# Patient Record
Sex: FEMALE | Race: WHITE | NOT HISPANIC OR LATINO | Employment: OTHER | URBAN - METROPOLITAN AREA
[De-identification: names, ages, dates, MRNs, and addresses within clinical notes are randomized per-mention and may not be internally consistent; named-entity substitution may affect disease eponyms.]

---

## 2017-08-04 ENCOUNTER — OFFICE VISIT (OUTPATIENT)
Dept: OBSTETRICS AND GYNECOLOGY | Facility: CLINIC | Age: 67
End: 2017-08-04
Payer: MEDICARE

## 2017-08-04 ENCOUNTER — LAB VISIT (OUTPATIENT)
Dept: LAB | Facility: HOSPITAL | Age: 67
End: 2017-08-04
Attending: OBSTETRICS & GYNECOLOGY
Payer: MEDICARE

## 2017-08-04 VITALS
WEIGHT: 113.13 LBS | DIASTOLIC BLOOD PRESSURE: 80 MMHG | BODY MASS INDEX: 20.82 KG/M2 | SYSTOLIC BLOOD PRESSURE: 218 MMHG | HEIGHT: 62 IN

## 2017-08-04 DIAGNOSIS — R19.09 OTHER INTRA-ABDOMINAL AND PELVIC SWELLING, MASS AND LUMP: ICD-10-CM

## 2017-08-04 DIAGNOSIS — N83.8 OVARIAN MASS, LEFT: ICD-10-CM

## 2017-08-04 LAB — CANCER AG125 SERPL-ACNC: 21 U/ML

## 2017-08-04 PROCEDURE — 36415 COLL VENOUS BLD VENIPUNCTURE: CPT

## 2017-08-04 PROCEDURE — 3008F BODY MASS INDEX DOCD: CPT | Mod: ,,, | Performed by: OBSTETRICS & GYNECOLOGY

## 2017-08-04 PROCEDURE — 88175 CYTOPATH C/V AUTO FLUID REDO: CPT

## 2017-08-04 PROCEDURE — 99203 OFFICE O/P NEW LOW 30 MIN: CPT | Mod: S$PBB,,, | Performed by: OBSTETRICS & GYNECOLOGY

## 2017-08-04 PROCEDURE — 86304 IMMUNOASSAY TUMOR CA 125: CPT

## 2017-08-04 PROCEDURE — 99999 PR PBB SHADOW E&M-EST. PATIENT-LVL II: CPT | Mod: PBBFAC,,, | Performed by: OBSTETRICS & GYNECOLOGY

## 2017-08-04 RX ORDER — NICOTINE 7MG/24HR
PATCH, TRANSDERMAL 24 HOURS TRANSDERMAL
Refills: 0 | COMMUNITY
Start: 2017-06-21 | End: 2018-08-20

## 2017-08-04 RX ORDER — CIPROFLOXACIN 500 MG/1
TABLET ORAL
Refills: 0 | COMMUNITY
Start: 2017-07-19 | End: 2017-08-04

## 2017-08-04 RX ORDER — PRAVASTATIN SODIUM 40 MG/1
40 TABLET ORAL
COMMUNITY
End: 2017-08-04 | Stop reason: CLARIF

## 2017-08-04 RX ORDER — HYDROCODONE BITARTRATE AND ACETAMINOPHEN 10; 325 MG/1; MG/1
TABLET ORAL
Refills: 0 | COMMUNITY
Start: 2017-07-19 | End: 2017-09-15

## 2017-08-04 RX ORDER — CLONIDINE HYDROCHLORIDE 0.1 MG/1
0.1 TABLET ORAL
COMMUNITY
Start: 2017-06-09 | End: 2017-09-15

## 2017-08-04 RX ORDER — ROSUVASTATIN CALCIUM 20 MG/1
20 TABLET, COATED ORAL DAILY
COMMUNITY
Start: 2017-06-09 | End: 2018-07-10 | Stop reason: SDUPTHER

## 2017-08-04 RX ORDER — DICYCLOMINE HYDROCHLORIDE 20 MG/1
TABLET ORAL
Refills: 0 | COMMUNITY
Start: 2017-05-24 | End: 2017-08-04

## 2017-08-04 RX ORDER — ASPIRIN 81 MG/1
81 TABLET ORAL DAILY
COMMUNITY
End: 2021-06-20 | Stop reason: CLARIF

## 2017-08-04 RX ORDER — METOPROLOL SUCCINATE 25 MG/1
25 TABLET, EXTENDED RELEASE ORAL DAILY
COMMUNITY
Start: 2017-06-09 | End: 2017-09-15 | Stop reason: ALTCHOICE

## 2017-08-04 RX ORDER — BUSPIRONE HYDROCHLORIDE 10 MG/1
10 TABLET ORAL 2 TIMES DAILY
COMMUNITY
End: 2018-12-18 | Stop reason: SDUPTHER

## 2017-08-04 RX ORDER — LISINOPRIL 40 MG/1
40 TABLET ORAL DAILY
COMMUNITY
End: 2017-09-18 | Stop reason: SDUPTHER

## 2017-08-04 RX ORDER — HYDRALAZINE HYDROCHLORIDE 50 MG/1
50 TABLET, FILM COATED ORAL 2 TIMES DAILY PRN
COMMUNITY
End: 2017-09-15

## 2017-08-04 NOTE — PROGRESS NOTES
Rebeca Woodard is a 67 y.o.  who presents for   Chief Complaint   Patient presents with    Ovarian Cyst     c/o ovarian cyst, was seen at [facility] and had CT and U/S.   - no post menopausal bleeding  - had one day of rectal bleeding after very difficult BM (Constipation) the day she went to the ER (reason she went)  - pt is poor historian but does not remember having a hysterctomy    No pain at all and no c/o    Did not take BP meds yet this AM and only po intake is coffee    Pt is not currently sexually active.    Very poor historian      17 CT at [facility]  Result Impression   1. 8 cm pelvic cyst above the bladder, just to the left of midline. An ovarian cyst is suspected. Pelvic anatomy may be further characterize with ultrasound as clinically indicated.  2. Atrophied left kidney with left ureteral stent in place.  3. Nonspecific presacral opacity. Does this patient have a history of treated rectal or cervical cancer?   Result Narrative   CT ABDOMEN PELVIS WO IV CONTRAST  CLINICAL INDICATION: Left lower quadrant abdominal pain  FINDINGS: Sections through the lower chest demonstrate no significant abnormality.    A CT scan of the abdomen demonstrates a liver and spleen which are within normal limits. The pancreas is within normal limits. There is adenomatous change of both adrenals. The left kidney is atrophied. There is a left ureteral stent in place. Calcifications associated with both kidneys are likely vascular.    A CT scan of the pelvis demonstrates the ureteral stent which terminates in the bladder. No stones are noted in the ureters. There is an 8 cm cyst in the pelvis just to the left of midline at There is some ill-defined presacral opacity. Right-sided thoracic       17 U/S OLOL  9.7 cm right ovarian cyst.   Result Narrative   US TRANSVAGINAL NON OB    CLINICAL INDICATION: Left-sided pelvic pain, ovarian cyst.    FINDINGS: Transvaginal sonography of the pelvis was performed. Uterus is not  "visualized on this exam. There is a 9.7 cm right ovarian cyst which contains some thin septations. The ovaries are otherwise not well visualized. There is no pelvic free fluid.         No hx of abnormal paps. Last pap was normal 2016, per pt.  Last mammogram was normal in 2016, per pt.  Last colonoscopy was 2015, per pt.    Past Medical History:   Diagnosis Date    Anxiety     Colon cancer     Disorder of kidney and ureter     Hepatitis C     Hyperlipidemia     Hypertension        Past Surgical History:   Procedure Laterality Date    COLON SURGERY      DILATION AND CURETTAGE OF UTERUS      missed ab    FEMORAL ARTERY STENT      bilateral    LUNG LOBECTOMY      left    OVARIAN CYST DRAINAGE      RENAL ARTERY STENT         Current Outpatient Prescriptions   Medication Sig Dispense Refill    aspirin (ECOTRIN) 81 MG EC tablet Take 81 mg by mouth.      busPIRone (BUSPAR) 10 MG tablet Take 10 mg by mouth.      cloNIDine (CATAPRES) 0.1 MG tablet Take 0.1 mg by mouth.      hydrALAZINE (APRESOLINE) 50 MG tablet Take 50 mg by mouth.      hydrocodone-acetaminophen 10-325mg (NORCO)  mg Tab TK 1 T PO  Q 8 H PRN P  0    lisinopril (PRINIVIL,ZESTRIL) 40 MG tablet Take 80 mg by mouth.      metoprolol succinate (TOPROL-XL) 25 MG 24 hr tablet Take 25 mg by mouth.      nicotine (NICODERM CQ) 7 mg/24 hr APPLY 1 PATCH QAM  0    rosuvastatin (CRESTOR) 20 MG tablet Take 20 mg by mouth.       No current facility-administered medications for this visit.           Review of patient's allergies indicates:  No Known Allergies    .  Family History   Problem Relation Age of Onset    Breast cancer Neg Hx     Colon cancer Neg Hx     Ovarian cancer Neg Hx     Thrombosis Neg Hx        Social History   Substance Use Topics    Smoking status: Former Smoker     Packs/day: 1.00     Years: 50.00     Types: Cigarettes     Quit date: 9/4/2016    Smokeless tobacco: Never Used    Alcohol use Yes      Comment: "2-3 beers " "daily or every other day"       BP (!) 218/80   Ht 5' 2" (1.575 m)   Wt 51.3 kg (113 lb 1.5 oz)   BMI 20.69 kg/m²     ROS:  GENERAL: No acute complaints. No significant change in weight. Feels great and planning a trip to her condo in [place] this wk  GI: No nausea, vomiting, melena, hematochezia.  : No dysuria, hematuria. No significant urinary incontinence.  GYN: No unusual discharge, pain, bleeding     GEN:  Alert and oriented lady in no acute distress. Ambulates easily  HEAD and NECK: Normocephalic. Normal thyroid to inspection and palpation  SKIN: No hirsutism   BREASTS: deferred               ABDOMEN: Flat, non tender. Mult midline abdominal scars. No mass, hernia, hepatomegaly, RUQT or CVAT.   PELVIC:      Vulva: normal genitalia. No suspicious lesions. No inguinal adenopathy.      Urethra: normal size and location. No lesion, prolapse, or discharge. Non tender.      Vagina: Very atrophic, no unusual discharge, no cystocele or rectocele      Cuff: difficult exam 2ary to atrophy but ? Flat appearing dimple suggestive of atrophic cx noted and pap done. Very scant bloody d/c.        No palpable uterus or cx           Bladder base is non tender.      Adnexa: No masses, tenderness, or CDS nodularity.         IMPRESSION: uncontrolled HTN, large cystic pelvic mass w fine septations - suspect asymptomatic benign process     COUNSELING:  - long talk re her current BP and need for urgent care eval of BP if it does not go down soon (took them "15 min ago" after advised to do so by my nurse)  - discussed significance of the imaging results and suspicion that this is a benign ovarian tumor, but pos malignancy     - in either event she will probably need surgery for dx and tx of symptoms    PLAN:  and pending result make rec's          Repeat BP: 150/60  "

## 2017-08-07 ENCOUNTER — TELEPHONE (OUTPATIENT)
Dept: OBSTETRICS AND GYNECOLOGY | Facility: CLINIC | Age: 67
End: 2017-08-07

## 2017-08-10 ENCOUNTER — TELEPHONE (OUTPATIENT)
Dept: OBSTETRICS AND GYNECOLOGY | Facility: CLINIC | Age: 67
End: 2017-08-10

## 2017-08-10 NOTE — TELEPHONE ENCOUNTER
----- Message from Tanvir Hnaey MD sent at 8/10/2017 12:03 PM CDT -----  Please call pt and let her know her pap was normal

## 2017-09-15 ENCOUNTER — OFFICE VISIT (OUTPATIENT)
Dept: INTERNAL MEDICINE | Facility: CLINIC | Age: 67
End: 2017-09-15
Payer: MEDICARE

## 2017-09-15 ENCOUNTER — OFFICE VISIT (OUTPATIENT)
Dept: OBSTETRICS AND GYNECOLOGY | Facility: CLINIC | Age: 67
End: 2017-09-15
Payer: MEDICARE

## 2017-09-15 ENCOUNTER — LAB VISIT (OUTPATIENT)
Dept: LAB | Facility: HOSPITAL | Age: 67
End: 2017-09-15
Attending: FAMILY MEDICINE
Payer: MEDICARE

## 2017-09-15 VITALS
BODY MASS INDEX: 20.29 KG/M2 | HEART RATE: 70 BPM | HEIGHT: 62 IN | WEIGHT: 110.25 LBS | DIASTOLIC BLOOD PRESSURE: 82 MMHG | TEMPERATURE: 97 F | OXYGEN SATURATION: 99 % | SYSTOLIC BLOOD PRESSURE: 190 MMHG

## 2017-09-15 VITALS
HEIGHT: 62 IN | SYSTOLIC BLOOD PRESSURE: 182 MMHG | WEIGHT: 112.88 LBS | BODY MASS INDEX: 20.77 KG/M2 | DIASTOLIC BLOOD PRESSURE: 68 MMHG

## 2017-09-15 DIAGNOSIS — I10 ESSENTIAL HYPERTENSION: Primary | ICD-10-CM

## 2017-09-15 DIAGNOSIS — I10 ESSENTIAL HYPERTENSION: ICD-10-CM

## 2017-09-15 DIAGNOSIS — N83.201 RIGHT OVARIAN CYST: ICD-10-CM

## 2017-09-15 LAB
ALBUMIN SERPL BCP-MCNC: 3.5 G/DL
ALP SERPL-CCNC: 95 U/L
ALT SERPL W/O P-5'-P-CCNC: 11 U/L
ANION GAP SERPL CALC-SCNC: 8 MMOL/L
AST SERPL-CCNC: 17 U/L
BASOPHILS # BLD AUTO: 0.04 K/UL
BASOPHILS NFR BLD: 0.6 %
BILIRUB SERPL-MCNC: 0.3 MG/DL
BUN SERPL-MCNC: 15 MG/DL
CALCIUM SERPL-MCNC: 9.7 MG/DL
CHLORIDE SERPL-SCNC: 111 MMOL/L
CHOLEST SERPL-MCNC: 236 MG/DL
CHOLEST/HDLC SERPL: 3.7 {RATIO}
CO2 SERPL-SCNC: 22 MMOL/L
CREAT SERPL-MCNC: 1 MG/DL
DIFFERENTIAL METHOD: ABNORMAL
EOSINOPHIL # BLD AUTO: 0.2 K/UL
EOSINOPHIL NFR BLD: 2.3 %
ERYTHROCYTE [DISTWIDTH] IN BLOOD BY AUTOMATED COUNT: 14.4 %
EST. GFR  (AFRICAN AMERICAN): >60 ML/MIN/1.73 M^2
EST. GFR  (NON AFRICAN AMERICAN): 59.3 ML/MIN/1.73 M^2
GLUCOSE SERPL-MCNC: 87 MG/DL
HCT VFR BLD AUTO: 35.9 %
HDLC SERPL-MCNC: 64 MG/DL
HDLC SERPL: 27.1 %
HGB BLD-MCNC: 11.6 G/DL
LDLC SERPL CALC-MCNC: 151.4 MG/DL
LYMPHOCYTES # BLD AUTO: 1.6 K/UL
LYMPHOCYTES NFR BLD: 22.8 %
MCH RBC QN AUTO: 26.9 PG
MCHC RBC AUTO-ENTMCNC: 32.3 G/DL
MCV RBC AUTO: 83 FL
MONOCYTES # BLD AUTO: 0.4 K/UL
MONOCYTES NFR BLD: 6.1 %
NEUTROPHILS # BLD AUTO: 4.8 K/UL
NEUTROPHILS NFR BLD: 67.8 %
NONHDLC SERPL-MCNC: 172 MG/DL
PLATELET # BLD AUTO: 284 K/UL
PMV BLD AUTO: 10.6 FL
POTASSIUM SERPL-SCNC: 4.5 MMOL/L
PROT SERPL-MCNC: 7.7 G/DL
RBC # BLD AUTO: 4.31 M/UL
SODIUM SERPL-SCNC: 141 MMOL/L
TRIGL SERPL-MCNC: 103 MG/DL
TSH SERPL DL<=0.005 MIU/L-ACNC: 0.67 UIU/ML
WBC # BLD AUTO: 7.1 K/UL

## 2017-09-15 PROCEDURE — 85025 COMPLETE CBC W/AUTO DIFF WBC: CPT

## 2017-09-15 PROCEDURE — 93005 ELECTROCARDIOGRAM TRACING: CPT | Mod: PBBFAC | Performed by: INTERNAL MEDICINE

## 2017-09-15 PROCEDURE — 93010 ELECTROCARDIOGRAM REPORT: CPT | Mod: S$PBB,,, | Performed by: INTERNAL MEDICINE

## 2017-09-15 PROCEDURE — 84443 ASSAY THYROID STIM HORMONE: CPT

## 2017-09-15 PROCEDURE — 99213 OFFICE O/P EST LOW 20 MIN: CPT | Mod: S$PBB,,, | Performed by: OBSTETRICS & GYNECOLOGY

## 2017-09-15 PROCEDURE — 99213 OFFICE O/P EST LOW 20 MIN: CPT | Mod: S$PBB,,, | Performed by: FAMILY MEDICINE

## 2017-09-15 PROCEDURE — 99999 PR PBB SHADOW E&M-EST. PATIENT-LVL III: CPT | Mod: PBBFAC,,, | Performed by: OBSTETRICS & GYNECOLOGY

## 2017-09-15 PROCEDURE — 36415 COLL VENOUS BLD VENIPUNCTURE: CPT

## 2017-09-15 PROCEDURE — 80061 LIPID PANEL: CPT

## 2017-09-15 PROCEDURE — 99213 OFFICE O/P EST LOW 20 MIN: CPT | Mod: PBBFAC,25 | Performed by: OBSTETRICS & GYNECOLOGY

## 2017-09-15 PROCEDURE — 99999 PR PBB SHADOW E&M-EST. PATIENT-LVL III: CPT | Mod: PBBFAC,,, | Performed by: FAMILY MEDICINE

## 2017-09-15 PROCEDURE — 99213 OFFICE O/P EST LOW 20 MIN: CPT | Mod: PBBFAC,27 | Performed by: FAMILY MEDICINE

## 2017-09-15 PROCEDURE — 80053 COMPREHEN METABOLIC PANEL: CPT

## 2017-09-15 RX ORDER — TRAMADOL HYDROCHLORIDE 50 MG/1
50 TABLET ORAL
COMMUNITY
End: 2017-09-15

## 2017-09-15 RX ORDER — AMLODIPINE BESYLATE 10 MG/1
10 TABLET ORAL DAILY
Qty: 30 TABLET | Refills: 11 | Status: SHIPPED | OUTPATIENT
Start: 2017-09-15 | End: 2019-01-10 | Stop reason: SDUPTHER

## 2017-09-15 NOTE — PROGRESS NOTES
68 yo CF w/ PMH of essential HTN    Presents to clinic for BP review directly from her OB/GYN.  Was with OB/GYN this morning when BP was noted to be significantly elevated. Sent for further evaluation.    Reports coffee this morning. No other drinks.    Patient Takes Lisinopril 40 daily ( took this morning), Metoprolol succinate 25mg (Did not take last night),  Patient has hydralazine 50mg tablets prescribed - but only takes it when she feels her blood pressure is too high.      Checks BP regular - states normal runs 140s systolic. She believes she was nervous today because of Doctor's appointment - induces anxiety.     Patient reports feeling entirely normal.  No chest pain  No shortness of breath  No n/v  No vision change  No headache.  No urination changes reported.

## 2017-09-15 NOTE — PROGRESS NOTES
"Rebeca Woodard is a 67 y.o.  who presents for follow-up of a roughly 9 cm ovarian cyst - states she is leaving to go to [place] for 2 months on Thurs for personal reasons, wants to wait until she returns for treatment, if possible.  - Ca 125 = 21    No hx of abnormal paps. Last pap was normal on 17.  Last mammogram was normal in , per pt.  Last colonoscopy was , per pt.    Past Medical History:   Diagnosis Date    Anxiety     Colon cancer     Disorder of kidney and ureter     Hepatitis C     Hyperlipidemia     Hypertension        Past Surgical History:   Procedure Laterality Date    COLON SURGERY      DILATION AND CURETTAGE OF UTERUS      missed ab    FEMORAL ARTERY STENT      bilateral    LUNG LOBECTOMY      left    OVARIAN CYST DRAINAGE      RENAL ARTERY STENT         Current Outpatient Prescriptions   Medication Sig Dispense Refill    aspirin (ECOTRIN) 81 MG EC tablet Take 81 mg by mouth.      busPIRone (BUSPAR) 10 MG tablet Take 10 mg by mouth.      hydrALAZINE (APRESOLINE) 50 MG tablet Take 50 mg by mouth.      lisinopril (PRINIVIL,ZESTRIL) 40 MG tablet Take 80 mg by mouth.      metoprolol succinate (TOPROL-XL) 25 MG 24 hr tablet Take 25 mg by mouth.      nicotine (NICODERM CQ) 7 mg/24 hr APPLY 1 PATCH QAM  0    rosuvastatin (CRESTOR) 20 MG tablet Take 20 mg by mouth.       No current facility-administered medications for this visit.           Review of patient's allergies indicates:  No Known Allergies    .  Family History   Problem Relation Age of Onset    Breast cancer Neg Hx     Colon cancer Neg Hx     Ovarian cancer Neg Hx     Thrombosis Neg Hx        Social History   Substance Use Topics    Smoking status: Former Smoker     Packs/day: 1.00     Years: 50.00     Types: Cigarettes     Quit date: 2016    Smokeless tobacco: Never Used    Alcohol use Yes      Comment: "2-3 beers daily or every other day"       BP (!) 182/68   Ht 5' 2" (1.575 m)   Wt 51.2 kg " (112 lb 14 oz)   BMI 20.65 kg/m²     - repeat 174/70 (states she just took her meds and feels fine)    ROS:  GENERAL: No acute complaints.       GEN:  Alert and oriented lady in no acute distress.      IMPRESSION: large asymptomatic probably benign ovarian cyst      COUNSELING:  - Reassured this is probably not ovarian cancer, but discussed possibility of torsion and rec she consider the surgery now as a torsion would require emergency surgery which is never good but especially bad if out of town  - Again had prolonged discussion re importance of BP control to reduce risks of stroke, CHF, etc and need to take meds as Rx'ed and rec she call her PCP today for appt asap or go to urgent care or try to get her appt here.    PLAN: pt needs to go help family and will postpone surgery til she returns and if symptomatic will go to ER or GYN in [place]. Would like to establish primary care here if possible.

## 2017-09-15 NOTE — PATIENT INSTRUCTIONS
Please continue to take your Lisinopril 40mg daily and start the new medication amlodipine 10 mg daily.     We will hold off using the metoprolol and hydralazine for now.    I would like to see you early next week for further evaluation of your blood pressure, discussion of lab findings, and further health maintenance.    If you ever experience chest pain, shortness of breath, change in vision or just not your self in a negative way with your blood pressure high present to ER or contact a doctor.      Controlling High Blood Pressure  High blood pressure (hypertension) is often called the silent killer. This is because many people who have it dont know it. High blood pressure is defined as 140/90 mm Hg or higher. Know your blood pressure and remember to check it regularly. Doing so can save your life. Here are some things you can do to help control your blood pressure.    Choose heart-healthy foods  · Select low-salt, low-fat foods. Limit sodium intake to 2,400 mg per day or the amount suggested by your healthcare provider.  · Limit canned, dried, cured, packaged, and fast foods. These can contain a lot of salt.  · Eat 8 to 10 servings of fruits and vegetables every day.  · Choose lean meats, fish, or chicken.  · Eat whole-grain pasta, brown rice, and beans.  · Eat 2 to 3 servings of low-fat or fat-free dairy products.  · Ask your doctor about the DASH eating plan. This plan helps reduce blood pressure.  · When you go to a restaurant, ask that your meal be prepared with no added salt.  Maintain a healthy weight  · Ask your healthcare provider how many calories to eat a day. Then stick to that number.  · Ask your healthcare provider what weight range is healthiest for you. If you are overweight, a weight loss of only 3% to 5% of your body weight can help lower blood pressure. Generally, a good weight loss goal is to lose 10% of your body weight in a year.  · Limit snacks and sweets.  · Get regular exercise.  Get up  and get active  · Choose activities you enjoy. Find ones you can do with friends or family. This includes bicycling, dancing, walking, and jogging.  · Park farther away from building entrances.  · Use stairs instead of the elevator.  · When you can, walk or bike instead of driving.  · [place] leaves, garden, or do household repairs.  · Be active at a moderate to vigorous level of physical activity for at least 40 minutes for a minimum of 3 to 4 days a week.   Manage stress  · Make time to relax and enjoy life. Find time to laugh.  · Communicate your concerns with your loved ones and your healthcare provider.  · Visit with family and friends, and keep up with hobbies.  Limit alcohol and quit smoking  · Men should have no more than 2 drinks per day.  · Women should have no more than 1 drink per day.  · Talk with your healthcare provider about quitting smoking. Smoking significantly increases your risk for heart disease and stroke. Ask your healthcare provider about community smoking cessation programs and other options.  Medicines  If lifestyle changes arent enough, your healthcare provider may prescribe high blood pressure medicine. Take all medicines as prescribed. If you have any questions about your medicines, ask your healthcare provider before stopping or changing them.   Date Last Reviewed: 4/27/2016  © 4239-2651 [employer]. [address] [place] [address], [place] [address]. All rights reserved. This information is not intended as a substitute for professional medical care. Always follow your healthcare professional's instructions.

## 2017-09-15 NOTE — PROGRESS NOTES
Subjective:       Patient ID: Rebeca Woodard is a 67 y.o. female.    Chief Complaint: Establish Care (Check Blood Pressure)    68 yo CF w/ PMH of essential HTN    Presents to clinic for BP review directly from her OB/GYN.  Was with OB/GYN this morning when BP was noted to be significantly elevated. Sent for further evaluation.    Reports coffee this morning. No other drinks.    Patient Takes Lisinopril 40 daily ( took this morning), Metoprolol succinate 25mg (Did not take last night),  Patient has hydralazine 50mg tablets prescribed - but only takes it when she feels her blood pressure is too high. She states she rarely takes it as it drops her blood pressure too low.  Patient does not watch low salt diet closely.      Checks BP regular - states normal runs 140s systolic. She believes she was nervous today because of Doctor's appointment - induces anxiety.   However she also noted that she has had to be hospitalized for sporadically and severely elevated blood pressure values.    Patient reports feeling entirely normal.  No chest pain  No shortness of breath  No N/V  No vision change  No headache.  No urination changes reported.    Former smoker.  No history of heart disease.    --------------------            09/15/17               1134     --------------------   BP:     (!) 190/82   Pulse:               Temp:               --------------------        Review of Systems   Constitutional: Negative for activity change, appetite change, fever and unexpected weight change.   HENT: Negative for congestion, sinus pressure and voice change.    Eyes: Negative for pain and visual disturbance.   Respiratory: Negative for cough, choking, chest tightness and shortness of breath.    Cardiovascular: Negative for chest pain and leg swelling.   Gastrointestinal: Negative for blood in stool, constipation, diarrhea, nausea and vomiting.   Endocrine: Negative for cold intolerance, heat intolerance and polyuria.    Genitourinary: Negative for difficulty urinating and dysuria.   Musculoskeletal: Negative for arthralgias, gait problem and myalgias.   Skin: Negative for color change, pallor and wound.   Neurological: Negative for dizziness, facial asymmetry, speech difficulty, weakness, light-headedness, numbness and headaches.   Hematological: Does not bruise/bleed easily.   Psychiatric/Behavioral: Negative for confusion. The patient is nervous/anxious.         Reports white coat anxiety.       Objective:      Physical Exam   Constitutional: She is oriented to person, place, and time. She appears well-developed and well-nourished. She does not have a sickly appearance. No distress.   HENT:   Head: Normocephalic and atraumatic.   Right Ear: External ear normal.   Left Ear: External ear normal.   Eyes: EOM and lids are normal. Right conjunctiva is not injected. Right conjunctiva has no hemorrhage. Left conjunctiva is not injected. Left conjunctiva has no hemorrhage.   Neck: Trachea normal, normal range of motion and full passive range of motion without pain.   Cardiovascular: Normal rate, regular rhythm, normal heart sounds and intact distal pulses.    Pulmonary/Chest: Effort normal and breath sounds normal. No accessory muscle usage or stridor. No tachypnea. No respiratory distress. She has no decreased breath sounds. She has no wheezes. She has no rhonchi. She has no rales.   Abdominal: Soft. Normal appearance and bowel sounds are normal. She exhibits no distension. There is no tenderness. There is no guarding. No hernia.   Musculoskeletal: Normal range of motion.   Lymphadenopathy:     She has no cervical adenopathy.   Neurological: She is alert and oriented to person, place, and time. She is not disoriented.   Skin: Skin is warm, dry and intact. No rash noted. She is not diaphoretic.   Psychiatric: She has a normal mood and affect. Her speech is normal and behavior is normal. Thought content normal.       Assessment:        1. Essential hypertension        Plan:   Essential hypertension    Patient presented to clinic with quite significantly elevated blood pressure - however was entirely asymptomatic. Patient took a 1/2 tablet of her 50 mg hydralazine. Tablet was halved as she reports she is very sensitive to it and it has dropped her too low in the past. Patient then rested in dark room for 45 minutes. On recheck it was dropped to 190 systolic and patient stayed asymptomatic.  EKG to be obtained.   CBC, CMP, TSH to be obtained for evaluation.  Lipid panel to be obtained given risk and former smoker.  Patient to see cardiology given her highly variable blood pressure readings - as she states she typically runs 130s-140s systolic, but has had these episodes of severely elevated blood pressure values requiring hospital admissions and ER visits. Likely will require further investigations (ie echo).    Addition of 10mg amlodipine for improved BP controlled. Continue Lisinopril. As she was not taking metoprolol regularly and hydralazine (almost ever) - hold those meds for now.  Advised to monitor and keep track of BP.    Patient will return on Monday to see cardiology for review, followed by visit with myself for lab review - health maintenance.      No Follow-up on file.

## 2017-09-18 ENCOUNTER — OFFICE VISIT (OUTPATIENT)
Dept: CARDIOLOGY | Facility: CLINIC | Age: 67
End: 2017-09-18
Payer: MEDICARE

## 2017-09-18 ENCOUNTER — OFFICE VISIT (OUTPATIENT)
Dept: INTERNAL MEDICINE | Facility: CLINIC | Age: 67
End: 2017-09-18
Payer: MEDICARE

## 2017-09-18 VITALS
HEART RATE: 88 BPM | DIASTOLIC BLOOD PRESSURE: 60 MMHG | BODY MASS INDEX: 20.77 KG/M2 | SYSTOLIC BLOOD PRESSURE: 156 MMHG | WEIGHT: 112.88 LBS | HEIGHT: 62 IN

## 2017-09-18 VITALS
HEART RATE: 81 BPM | SYSTOLIC BLOOD PRESSURE: 124 MMHG | OXYGEN SATURATION: 95 % | DIASTOLIC BLOOD PRESSURE: 70 MMHG | WEIGHT: 112.44 LBS | HEIGHT: 62 IN | BODY MASS INDEX: 20.69 KG/M2 | TEMPERATURE: 97 F

## 2017-09-18 DIAGNOSIS — I10 ESSENTIAL HYPERTENSION: Primary | ICD-10-CM

## 2017-09-18 DIAGNOSIS — Z12.31 ENCOUNTER FOR SCREENING MAMMOGRAM FOR BREAST CANCER: ICD-10-CM

## 2017-09-18 DIAGNOSIS — I77.9 CAROTID ARTERY DISEASE, UNSPECIFIED LATERALITY: ICD-10-CM

## 2017-09-18 DIAGNOSIS — E78.5 HYPERLIPIDEMIA, UNSPECIFIED HYPERLIPIDEMIA TYPE: ICD-10-CM

## 2017-09-18 DIAGNOSIS — Z78.0 POST-MENOPAUSAL: ICD-10-CM

## 2017-09-18 DIAGNOSIS — Z87.891 FORMER HEAVY CIGARETTE SMOKER (20-39 PER DAY): ICD-10-CM

## 2017-09-18 DIAGNOSIS — I63.9 CEREBROVASCULAR ACCIDENT (CVA), UNSPECIFIED MECHANISM: ICD-10-CM

## 2017-09-18 DIAGNOSIS — N18.30 STAGE 3 CHRONIC KIDNEY DISEASE: ICD-10-CM

## 2017-09-18 DIAGNOSIS — Z91.89 AT RISK FOR LOSS OF BONE DENSITY: ICD-10-CM

## 2017-09-18 PROCEDURE — 99999 PR PBB SHADOW E&M-EST. PATIENT-LVL III: CPT | Mod: PBBFAC,,, | Performed by: INTERNAL MEDICINE

## 2017-09-18 PROCEDURE — 99213 OFFICE O/P EST LOW 20 MIN: CPT | Mod: PBBFAC,27 | Performed by: FAMILY MEDICINE

## 2017-09-18 PROCEDURE — 99213 OFFICE O/P EST LOW 20 MIN: CPT | Mod: S$PBB,,, | Performed by: FAMILY MEDICINE

## 2017-09-18 PROCEDURE — 99213 OFFICE O/P EST LOW 20 MIN: CPT | Mod: PBBFAC | Performed by: INTERNAL MEDICINE

## 2017-09-18 PROCEDURE — 99203 OFFICE O/P NEW LOW 30 MIN: CPT | Mod: S$PBB,,, | Performed by: INTERNAL MEDICINE

## 2017-09-18 PROCEDURE — 99999 PR PBB SHADOW E&M-EST. PATIENT-LVL III: CPT | Mod: PBBFAC,,, | Performed by: FAMILY MEDICINE

## 2017-09-18 RX ORDER — LISINOPRIL 40 MG/1
40 TABLET ORAL DAILY
Qty: 90 TABLET | Refills: 1 | Status: SHIPPED | OUTPATIENT
Start: 2017-09-18 | End: 2018-05-29 | Stop reason: SDUPTHER

## 2017-09-18 NOTE — PROGRESS NOTES
Subjective:       Patient ID: Rebeca Woodard is a 67 y.o. female.    Chief Complaint: Hypertension (followup)    66 yo CF w/ PMH of HLD, HTN, Stroke (2012ish - no residual deficits), Hep C ( treated ), Vascular disease ( follows at [facility] ).      Presents for follow after last week's visit at which she had significantly elevated HTN.  She saw cardiology today. ECHO to be scheduled.  No CP, No SOB, No fevers, No chills    Is due for mammogram  Has never had DEXA scan    Leaving Thursday to go to [place] to help family for several months.  Nov 25th - for a cruise.    Desires to wait for after to obtain screening test.      Hypertension   Pertinent negatives include no chest pain or shortness of breath.     Review of Systems   Constitutional: Negative for activity change, appetite change, fever and unexpected weight change.   HENT: Negative for congestion, sinus pressure and voice change.    Eyes: Negative for pain and visual disturbance.   Respiratory: Negative for cough and shortness of breath.    Cardiovascular: Negative for chest pain and leg swelling.   Gastrointestinal: Negative for blood in stool, constipation, diarrhea, nausea and vomiting.   Endocrine: Negative for cold intolerance, heat intolerance and polyuria.   Genitourinary: Negative for difficulty urinating, dysuria, vaginal bleeding and vaginal discharge.   Musculoskeletal: Negative for arthralgias, gait problem and myalgias.   Skin: Negative for color change and wound.   Neurological: Negative for dizziness, weakness and light-headedness.   Hematological: Does not bruise/bleed easily.   Psychiatric/Behavioral: Negative for confusion. The patient is not nervous/anxious.        Objective:       Vitals:    09/18/17 1450   BP: 124/70   Pulse: 81   Temp: 96.9 °F (36.1 °C)       Physical Exam   Constitutional: She is oriented to person, place, and time. She appears well-developed and well-nourished. She does not have a sickly appearance. No distress.    HENT:   Head: Normocephalic and atraumatic.   Right Ear: External ear normal.   Left Ear: External ear normal.   Eyes: Conjunctivae, EOM and lids are normal.   Neck: Trachea normal, normal range of motion and full passive range of motion without pain.   Cardiovascular: Normal rate, regular rhythm, normal heart sounds and intact distal pulses.    Pulmonary/Chest: Effort normal and breath sounds normal. No stridor. No respiratory distress.   Abdominal: Normal appearance.   Musculoskeletal: Normal range of motion.   Lymphadenopathy:     She has no cervical adenopathy.   Neurological: She is alert and oriented to person, place, and time. She is not disoriented.   Skin: Skin is warm, dry and intact. No rash noted. She is not diaphoretic.   Psychiatric: She has a normal mood and affect. Her speech is normal and behavior is normal. Thought content normal.       Assessment:       1. Essential hypertension    2. Encounter for screening mammogram for breast cancer    3. At risk for loss of bone density    4. Former heavy cigarette smoker (20-39 per day)    5. Stage 3 chronic kidney disease    6. Post-menopausal        Plan:   Essential hypertension  Much improved since last visit.  Now following cardiology  Echo to be scheduled  Refill lisinopril as she will run out while she is away.    -     lisinopril (PRINIVIL,ZESTRIL) 40 MG tablet; Take 1 tablet (40 mg total) by mouth once daily.  Dispense: 90 tablet; Refill: 1    Encounter for screening mammogram for breast cancer    -     Mammo Digital Screening Bilat with CAD; Future; Expected date: 09/18/2017    At risk for loss of bone density  -     DXA Bone Density Spine And Hip; Future; Expected date: 09/18/2017    Former heavy cigarette smoker (20-39 per day)  -     DXA Bone Density Spine And Hip; Future; Expected date: 09/18/2017    Stage 3 chronic kidney disease  Plan on obtaining previous medical records from [facility] as she states she has had blood work and evaluation  before related to her kidneys.    -     DXA Bone Density Spine And Hip; Future; Expected date: 09/18/2017    Post-menopausal  -     DXA Bone Density Spine And Hip; Future; Expected date: 09/18/2017      Offered immunizations - declined. States did not want to be stuck with needle.        No Follow-up on file.

## 2017-09-18 NOTE — PROGRESS NOTES
"Subjective:   Patient ID:  Rebeca Woodard is a 67 y.o. female who presents for cardiac consult of Hypertension and Hyperlipidemia      HPI  The patient came in today for cardiac consult of Hypertension and Hyperlipidemia    This is a 67 year old female pt with PMHx HTN, anxiety, HLD, stroke in 2010, s/p partial lung resection, PAD s/p LE stents, carotid artery disease presents for initial evaluation for HTN.     HTN diagnosed >10 years ago and has been monitoring it. BP at home has been 110s/60s-70s, but occasionally has been high as 250/100s. She does have white coat HTN. Does not have headaches/blurry vision with headache. Currently taking norvasc and lisinopril, was taking hydralazine PRN for elevated SBP. Family history - father had open surgery in 60s.     She started taking Norvasc 3 days ago and has tolerated it well. Tries to follow salt diet, DASH diet.     Patient feels well, no specific complaints, no chest pain, no sob, no leg swelling, no PND, no palpitation, no dizziness, no syncope, no CNS symptoms.    Patient is compliant with medications.    Past Medical History:   Diagnosis Date    Anxiety     Carotid artery disease 9/18/2017    Colon cancer     Disorder of kidney and ureter     Hepatitis C     Hyperlipidemia     Hypertension     Stroke        Past Surgical History:   Procedure Laterality Date    COLON SURGERY      DILATION AND CURETTAGE OF UTERUS      missed ab    FEMORAL ARTERY STENT      bilateral    LUNG LOBECTOMY      left    OVARIAN CYST DRAINAGE      RENAL ARTERY STENT         Social History   Substance Use Topics    Smoking status: Former Smoker     Packs/day: 1.00     Years: 50.00     Types: Cigarettes     Quit date: 9/4/2016    Smokeless tobacco: Former User    Alcohol use Yes      Comment: "2-3 beers daily or every other day"       Family History   Problem Relation Age of Onset    Breast cancer Neg Hx     Colon cancer Neg Hx     Ovarian cancer Neg Hx     Thrombosis " "Neg Hx        Patient's Medications   New Prescriptions    No medications on file   Previous Medications    AMLODIPINE (NORVASC) 10 MG TABLET    Take 1 tablet (10 mg total) by mouth once daily.    ASPIRIN (ECOTRIN) 81 MG EC TABLET    Take 81 mg by mouth once daily.     BUSPIRONE (BUSPAR) 10 MG TABLET    Take 10 mg by mouth 2 (two) times daily.     LISINOPRIL (PRINIVIL,ZESTRIL) 40 MG TABLET    Take 40 mg by mouth once daily.     NICOTINE (NICODERM CQ) 7 MG/24 HR    APPLY 1 PATCH QAM    ROSUVASTATIN (CRESTOR) 20 MG TABLET    Take 20 mg by mouth once daily.    Modified Medications    No medications on file   Discontinued Medications    No medications on file       Review of Systems   Constitutional: Negative.    HENT: Negative.    Eyes: Negative.    Respiratory: Negative.    Cardiovascular: Negative.    Gastrointestinal: Negative.    Genitourinary: Negative.    Musculoskeletal: Negative.    Skin: Negative.    Neurological: Negative.    Endo/Heme/Allergies: Negative.    Psychiatric/Behavioral: Negative.    All 12 systems otherwise negative.      Wt Readings from Last 3 Encounters:   09/18/17 51.2 kg (112 lb 14 oz)   09/15/17 51.2 kg (112 lb 14 oz)   09/15/17 50 kg (110 lb 3.7 oz)     Temp Readings from Last 3 Encounters:   09/15/17 96.5 °F (35.8 °C) (Tympanic)     BP Readings from Last 3 Encounters:   09/18/17 (!) 156/60   09/15/17 (!) 182/68   09/15/17 (!) 190/82     Pulse Readings from Last 3 Encounters:   09/18/17 88   09/15/17 70       9/15/17 ECG:  Normal sinus rhythm with sinus arrhythmia  Biatrial enlargement  Rightward axis  Pulmonary disease pattern  LVH    BP (!) 156/60 (BP Method: Small (Manual))   Pulse 88   Ht 5' 2" (1.575 m)   Wt 51.2 kg (112 lb 14 oz)   BMI 20.65 kg/m²     Objective:   Physical Exam   Constitutional: She is oriented to person, place, and time. She appears well-developed and well-nourished. No distress.   HENT:   Head: Normocephalic and atraumatic.   Nose: Nose normal.   Mouth/Throat: " Oropharynx is clear and moist.   Eyes: Conjunctivae and EOM are normal. No scleral icterus.   Neck: Normal range of motion. Neck supple. No JVD present. No thyromegaly present.   Right carotid bruit +   Cardiovascular: Normal rate, regular rhythm, S1 normal and S2 normal.  Exam reveals no gallop, no S3, no S4 and no friction rub.    No murmur heard.  Pulmonary/Chest: Effort normal and breath sounds normal. No stridor. No respiratory distress. She has no wheezes. She has no rales. She exhibits no tenderness.   Abdominal: Soft. Bowel sounds are normal. She exhibits no distension and no mass. There is no tenderness. There is no rebound.   Genitourinary:   Genitourinary Comments: Deferred   Musculoskeletal: Normal range of motion. She exhibits no edema, tenderness or deformity.   Lymphadenopathy:     She has no cervical adenopathy.   Neurological: She is alert and oriented to person, place, and time. She exhibits normal muscle tone. Coordination normal.   Skin: Skin is warm and dry. No rash noted. She is not diaphoretic. No erythema. No pallor.   Psychiatric: She has a normal mood and affect. Her behavior is normal. Judgment and thought content normal.   Nursing note and vitals reviewed.      Lab Results   Component Value Date     09/15/2017    K 4.5 09/15/2017     (H) 09/15/2017    CO2 22 (L) 09/15/2017    BUN 15 09/15/2017    CREATININE 1.0 09/15/2017    GLU 87 09/15/2017    AST 17 09/15/2017    ALT 11 09/15/2017    ALBUMIN 3.5 09/15/2017    PROT 7.7 09/15/2017    BILITOT 0.3 09/15/2017    WBC 7.10 09/15/2017    HGB 11.6 (L) 09/15/2017    HCT 35.9 (L) 09/15/2017    MCV 83 09/15/2017     09/15/2017    TSH 0.671 09/15/2017    CHOL 236 (H) 09/15/2017    HDL 64 09/15/2017    LDLCALC 151.4 09/15/2017    TRIG 103 09/15/2017     Assessment:      67 year old female pt with PMHx HTN, anxiety, HLD, stroke in 2010, s/p partial lung resection, PAD s/p LE stents, carotid artery disease presents for evaluation  for HTN.     1. Essential hypertension    2. Hyperlipidemia, unspecified hyperlipidemia type    3. Carotid artery disease, unspecified laterality    4. Cerebrovascular accident (CVA), unspecified mechanism        Plan:     1. HTN  - BP improved to 156/60, goal = <150/90  - continue Norvasc 10mg and Lisinopril 40 mg daily  - discussed DASH low salt diet  - check 2D echo to evaluate for LVH, valvular disease, and LV function  - cont ambulatory BP monitoring at home   - if BP remains elevated will add meds but pt is sensitive to low BPs and BP well controlled at home    2. HLD  - cont statin    3. PAD s/p LE stents  - cont asa, statin  - follow up with vascular at [facility]    4. Prior stroke  - cont asa, statin    Thank you for allowing me to participate in this patient's care. Please do not hesitate to contact me with any questions or concerns. Consult note has been forwarded to the referral physician.

## 2017-09-18 NOTE — LETTER
September 18, 2017      Jack Haney MD  [id number] [place] [id number]           [facility]  [address] [place] [address]  Phone: [phone]  Fax: [phone]          Patient: Rebeca Woodard   MR Number: [id number]   YOB: 1950   Date of Visit: 9/18/2017       Dear Dr. Jack Haney:    Thank you for referring Rebeca Woodard to me for evaluation. Attached you will find relevant portions of my assessment and plan of care.    If you have questions, please do not hesitate to call me. I look forward to following Rebeca Woodard along with you.    Sincerely,    Fabian Ibarra MD    Enclosure  CC:  No Recipients    If you would like to receive this communication electronically, please contact [email or web address] or [phone] to request more information on [employer] Link access.    For providers and/or their staff who would like to refer a patient to [facility], please contact us through our one-stop-shop provider referral line, [facility] , at [phone].    If you feel you have received this communication in error or would no longer like to receive these types of communications, please e-mail [email or web address]

## 2018-05-07 ENCOUNTER — PATIENT OUTREACH (OUTPATIENT)
Dept: ADMINISTRATIVE | Facility: HOSPITAL | Age: 68
End: 2018-05-07

## 2018-05-29 DIAGNOSIS — I10 ESSENTIAL HYPERTENSION: ICD-10-CM

## 2018-05-29 RX ORDER — LISINOPRIL 40 MG/1
TABLET ORAL
Qty: 90 TABLET | Refills: 0 | Status: SHIPPED | OUTPATIENT
Start: 2018-05-29 | End: 2018-07-17

## 2018-07-09 ENCOUNTER — HOSPITAL ENCOUNTER (EMERGENCY)
Facility: HOSPITAL | Age: 68
Discharge: HOME | End: 2018-07-09
Attending: EMERGENCY MEDICINE
Payer: MEDICARE

## 2018-07-09 ENCOUNTER — OFFICE VISIT (OUTPATIENT)
Dept: INTERNAL MEDICINE | Facility: CLINIC | Age: 68
End: 2018-07-09
Payer: MEDICARE

## 2018-07-09 ENCOUNTER — TELEPHONE (OUTPATIENT)
Dept: INTERNAL MEDICINE | Facility: CLINIC | Age: 68
End: 2018-07-09

## 2018-07-09 VITALS
HEART RATE: 73 BPM | OXYGEN SATURATION: 99 % | SYSTOLIC BLOOD PRESSURE: 255 MMHG | TEMPERATURE: 97 F | BODY MASS INDEX: 19.48 KG/M2 | WEIGHT: 106.5 LBS | DIASTOLIC BLOOD PRESSURE: 90 MMHG | RESPIRATION RATE: 18 BRPM

## 2018-07-09 VITALS
RESPIRATION RATE: 19 BRPM | TEMPERATURE: 98 F | DIASTOLIC BLOOD PRESSURE: 86 MMHG | HEART RATE: 73 BPM | BODY MASS INDEX: 20.69 KG/M2 | OXYGEN SATURATION: 100 % | WEIGHT: 112.44 LBS | SYSTOLIC BLOOD PRESSURE: 194 MMHG | HEIGHT: 62 IN

## 2018-07-09 DIAGNOSIS — N28.9 RENAL INSUFFICIENCY: ICD-10-CM

## 2018-07-09 DIAGNOSIS — I10 HYPERTENSION: Primary | ICD-10-CM

## 2018-07-09 DIAGNOSIS — I10 HYPERTENSION, UNSPECIFIED TYPE: Primary | ICD-10-CM

## 2018-07-09 LAB
ALBUMIN SERPL BCP-MCNC: 4 G/DL
ALP SERPL-CCNC: 82 U/L
ALT SERPL W/O P-5'-P-CCNC: 12 U/L
ANION GAP SERPL CALC-SCNC: 12 MMOL/L
AST SERPL-CCNC: 16 U/L
BASOPHILS # BLD AUTO: 0.04 K/UL
BASOPHILS NFR BLD: 0.5 %
BILIRUB SERPL-MCNC: 0.3 MG/DL
BUN SERPL-MCNC: 34 MG/DL
CALCIUM SERPL-MCNC: 10.2 MG/DL
CHLORIDE SERPL-SCNC: 109 MMOL/L
CO2 SERPL-SCNC: 19 MMOL/L
CREAT SERPL-MCNC: 1.8 MG/DL
DIFFERENTIAL METHOD: NORMAL
EOSINOPHIL # BLD AUTO: 0.2 K/UL
EOSINOPHIL NFR BLD: 2.9 %
ERYTHROCYTE [DISTWIDTH] IN BLOOD BY AUTOMATED COUNT: 14.5 %
EST. GFR  (AFRICAN AMERICAN): 33 ML/MIN/1.73 M^2
EST. GFR  (NON AFRICAN AMERICAN): 29 ML/MIN/1.73 M^2
GLUCOSE SERPL-MCNC: 96 MG/DL
HCT VFR BLD AUTO: 39.8 %
HGB BLD-MCNC: 13.1 G/DL
LYMPHOCYTES # BLD AUTO: 1.4 K/UL
LYMPHOCYTES NFR BLD: 19.1 %
MCH RBC QN AUTO: 28 PG
MCHC RBC AUTO-ENTMCNC: 32.9 G/DL
MCV RBC AUTO: 85 FL
MONOCYTES # BLD AUTO: 0.7 K/UL
MONOCYTES NFR BLD: 9.4 %
NEUTROPHILS # BLD AUTO: 5.1 K/UL
NEUTROPHILS NFR BLD: 68.1 %
PLATELET # BLD AUTO: 209 K/UL
PMV BLD AUTO: 10.4 FL
POTASSIUM SERPL-SCNC: 5.1 MMOL/L
PROT SERPL-MCNC: 8.5 G/DL
RBC # BLD AUTO: 4.68 M/UL
SODIUM SERPL-SCNC: 140 MMOL/L
TROPONIN I SERPL DL<=0.01 NG/ML-MCNC: 0.02 NG/ML
WBC # BLD AUTO: 7.47 K/UL

## 2018-07-09 PROCEDURE — 85025 COMPLETE CBC W/AUTO DIFF WBC: CPT

## 2018-07-09 PROCEDURE — 63600175 PHARM REV CODE 636 W HCPCS: Performed by: EMERGENCY MEDICINE

## 2018-07-09 PROCEDURE — 96374 THER/PROPH/DIAG INJ IV PUSH: CPT

## 2018-07-09 PROCEDURE — 25000003 PHARM REV CODE 250: Performed by: EMERGENCY MEDICINE

## 2018-07-09 PROCEDURE — 36415 COLL VENOUS BLD VENIPUNCTURE: CPT

## 2018-07-09 PROCEDURE — 99214 OFFICE O/P EST MOD 30 MIN: CPT | Mod: S$PBB,,, | Performed by: FAMILY MEDICINE

## 2018-07-09 PROCEDURE — 96375 TX/PRO/DX INJ NEW DRUG ADDON: CPT

## 2018-07-09 PROCEDURE — 99284 EMERGENCY DEPT VISIT MOD MDM: CPT | Mod: 25,27

## 2018-07-09 PROCEDURE — 93005 ELECTROCARDIOGRAM TRACING: CPT

## 2018-07-09 PROCEDURE — 84484 ASSAY OF TROPONIN QUANT: CPT

## 2018-07-09 PROCEDURE — 80053 COMPREHEN METABOLIC PANEL: CPT

## 2018-07-09 PROCEDURE — 93010 ELECTROCARDIOGRAM REPORT: CPT | Mod: ,,, | Performed by: INTERNAL MEDICINE

## 2018-07-09 PROCEDURE — 99999 PR PBB SHADOW E&M-EST. PATIENT-LVL III: CPT | Mod: PBBFAC,,, | Performed by: FAMILY MEDICINE

## 2018-07-09 PROCEDURE — 99213 OFFICE O/P EST LOW 20 MIN: CPT | Mod: PBBFAC | Performed by: FAMILY MEDICINE

## 2018-07-09 RX ORDER — LABETALOL HYDROCHLORIDE 5 MG/ML
20 INJECTION, SOLUTION INTRAVENOUS
Status: COMPLETED | OUTPATIENT
Start: 2018-07-09 | End: 2018-07-09

## 2018-07-09 RX ORDER — HYDROCODONE BITARTRATE AND ACETAMINOPHEN 7.5; 325 MG/15ML; MG/15ML
10 SOLUTION ORAL
COMMUNITY
Start: 2018-07-09 | End: 2018-07-19

## 2018-07-09 RX ORDER — AMLODIPINE BESYLATE 5 MG/1
10 TABLET ORAL
Status: COMPLETED | OUTPATIENT
Start: 2018-07-09 | End: 2018-07-09

## 2018-07-09 RX ORDER — HYDRALAZINE HYDROCHLORIDE 20 MG/ML
10 INJECTION INTRAMUSCULAR; INTRAVENOUS
Status: COMPLETED | OUTPATIENT
Start: 2018-07-09 | End: 2018-07-09

## 2018-07-09 RX ORDER — DIPHENHYDRAMINE HYDROCHLORIDE 50 MG/ML
25 INJECTION INTRAMUSCULAR; INTRAVENOUS
Status: COMPLETED | OUTPATIENT
Start: 2018-07-09 | End: 2018-07-09

## 2018-07-09 RX ADMIN — HYDRALAZINE HYDROCHLORIDE 10 MG: 20 INJECTION INTRAMUSCULAR; INTRAVENOUS at 04:07

## 2018-07-09 RX ADMIN — AMLODIPINE BESYLATE 10 MG: 5 TABLET ORAL at 06:07

## 2018-07-09 RX ADMIN — DIPHENHYDRAMINE HYDROCHLORIDE 25 MG: 50 INJECTION, SOLUTION INTRAMUSCULAR; INTRAVENOUS at 04:07

## 2018-07-09 RX ADMIN — LABETALOL HYDROCHLORIDE 20 MG: 5 INJECTION, SOLUTION INTRAVENOUS at 05:07

## 2018-07-09 NOTE — TELEPHONE ENCOUNTER
Asked patient about establishing care with Dr Haney.  Set an appointment for today at 2:40pm./brice/

## 2018-07-09 NOTE — TELEPHONE ENCOUNTER
----- Message from Jenn Matos sent at 7/9/2018 10:21 AM CDT -----  Contact: Pt   Pt requested a callback to discuss she was diagnosed with throat cancer wanted to know if she need to be seen before she start chemo callback number is..[phone] (home)

## 2018-07-09 NOTE — ED PROVIDER NOTES
SCRIBE #1 NOTE: I, Argentina Casper, am scribing for, and in the presence of, Keyshawn Torres DO. I have scribed the entire note.      History      Chief Complaint   Patient presents with    Hypertension     pt was seeing PCP for routine checkup and was sent to ER for eval for HTN       Review of patient's allergies indicates:  No Known Allergies     HPI   HPI    7/9/2018, 4:34 PM   History obtained from the patient      History of Present Illness: Rebeca Woodard is a 68 y.o. female patient with PMHx of HTN who presents to the Emergency Department for elevated blood pressure which onset gradually today. Patient was at her PCP office for routine checkup and was referred to ED for elevated BP. Symptoms are constant and moderate in severity. No mitigating or exacerbating factors reported. Patient states she is asymptomatic at this time. Patient denies any fever, chills, dizziness, blurred vision, CP, SOB, extremity weakness/numbness, N/V, and all other sxs at this time. Patient states she has been noncompliant with her Norvasc. No further complaints or concerns at this time.     Arrival mode: Personal vehicle      PCP: Jack Haney MD       Past Medical History:  Past Medical History:   Diagnosis Date    Anxiety     Carotid artery disease 9/18/2017    Colon cancer     Disorder of kidney and ureter     Hepatitis C     Hyperlipidemia     Hypertension     Stroke        Past Surgical History:  Past Surgical History:   Procedure Laterality Date    COLON SURGERY      DILATION AND CURETTAGE OF UTERUS      missed ab    FEMORAL ARTERY STENT      bilateral    LUNG LOBECTOMY      left    OVARIAN CYST DRAINAGE      RENAL ARTERY STENT           Family History:  Family History   Problem Relation Age of Onset    Breast cancer Neg Hx     Colon cancer Neg Hx     Ovarian cancer Neg Hx     Thrombosis Neg Hx        Social History:  Social History     Social History Main Topics    Smoking status: Former Smoker      "Packs/day: 1.00     Years: 50.00     Types: Cigarettes     Quit date: 9/4/2016    Smokeless tobacco: Former User    Alcohol use Yes      Comment: "2-3 beers daily or every other day"    Drug use: Yes     Types: Marijuana      Comment: occasionally    Sexual activity: Not Currently       ROS   Review of Systems   Constitutional: Negative for chills and fever.        (+) elevated blood pressure   HENT: Negative for sore throat.    Eyes: Negative for visual disturbance.   Respiratory: Negative for shortness of breath.    Cardiovascular: Negative for chest pain.   Gastrointestinal: Negative for nausea and vomiting.   Genitourinary: Negative for dysuria.   Musculoskeletal: Negative for back pain.   Skin: Negative for rash.   Neurological: Negative for dizziness, weakness and numbness.   Hematological: Does not bruise/bleed easily.   All other systems reviewed and are negative.      Physical Exam      Initial Vitals [07/09/18 1532]   BP Pulse Resp Temp SpO2   (!) 246/98 85 18 97.9 °F (36.6 °C) 95 %      MAP       --          Physical Exam  Nursing Notes and Vital Signs Reviewed.  Constitutional: Patient is in no acute distress. Well-developed and well-nourished.  Head: Atraumatic. Normocephalic.  Eyes: PERRL. EOM intact. Conjunctivae are not pale. No scleral icterus.  ENT: Mucous membranes are moist. Oropharynx is clear and symmetric.    Neck: Supple. Full ROM. No lymphadenopathy.  Cardiovascular: Regular rate. Regular rhythm. No murmurs, rubs, or gallops. Distal pulses are 2+ and symmetric.  Pulmonary/Chest: No respiratory distress. Clear to auscultation bilaterally. No wheezing or rales.  Abdominal: Soft and non-distended.  There is no tenderness.  No rebound, guarding, or rigidity. Good bowel sounds.  Genitourinary: No CVA tenderness  Musculoskeletal: Moves all extremities. No obvious deformities. No edema.  Skin: Warm and dry.  Neurological:  Alert, awake, and appropriate.  Normal speech.  No acute focal " "neurological deficits are appreciated.  Psychiatric: Normal affect. Good eye contact. Appropriate in content.    ED Course    Procedures  ED Vital Signs:  Vitals:    07/09/18 1532 07/09/18 1652 07/09/18 1706 07/09/18 1744   BP: (!) 246/98 (!) 270/100 (!) 250/90 (!) 173/80   Pulse: 85   74   Resp: 18   (!) 23   Temp: 97.9 °F (36.6 °C)      TempSrc: Oral      SpO2: 95%   100%   Weight: 51 kg (112 lb 7 oz)      Height: 5' 2" (1.575 m)       07/09/18 1746 07/09/18 1801   BP: (!) 175/80 (!) 194/86   Pulse: 74 73   Resp: 19 19   Temp:     TempSrc:     SpO2: 100% 100%   Weight:     Height:         Abnormal Lab Results:  Labs Reviewed   COMPREHENSIVE METABOLIC PANEL - Abnormal; Notable for the following:        Result Value    CO2 19 (*)     BUN, Bld 34 (*)     Creatinine 1.8 (*)     Total Protein 8.5 (*)     eGFR if  33 (*)     eGFR if non  29 (*)     All other components within normal limits   CBC W/ AUTO DIFFERENTIAL   TROPONIN I        All Lab Results:  Results for orders placed or performed during the hospital encounter of 07/09/18   CBC auto differential   Result Value Ref Range    WBC 7.47 3.90 - 12.70 K/uL    RBC 4.68 4.00 - 5.40 M/uL    Hemoglobin 13.1 12.0 - 16.0 g/dL    Hematocrit 39.8 37.0 - 48.5 %    MCV 85 82 - 98 fL    MCH 28.0 27.0 - 31.0 pg    MCHC 32.9 32.0 - 36.0 g/dL    RDW 14.5 11.5 - 14.5 %    Platelets 209 150 - 350 K/uL    MPV 10.4 9.2 - 12.9 fL    Gran # (ANC) 5.1 1.8 - 7.7 K/uL    Lymph # 1.4 1.0 - 4.8 K/uL    Mono # 0.7 0.3 - 1.0 K/uL    Eos # 0.2 0.0 - 0.5 K/uL    Baso # 0.04 0.00 - 0.20 K/uL    Gran% 68.1 38.0 - 73.0 %    Lymph% 19.1 18.0 - 48.0 %    Mono% 9.4 4.0 - 15.0 %    Eosinophil% 2.9 0.0 - 8.0 %    Basophil% 0.5 0.0 - 1.9 %    Differential Method Automated    Comprehensive metabolic panel   Result Value Ref Range    Sodium 140 136 - 145 mmol/L    Potassium 5.1 3.5 - 5.1 mmol/L    Chloride 109 95 - 110 mmol/L    CO2 19 (L) 23 - 29 mmol/L    Glucose 96 70 - " 110 mg/dL    BUN, Bld 34 (H) 8 - 23 mg/dL    Creatinine 1.8 (H) 0.5 - 1.4 mg/dL    Calcium 10.2 8.7 - 10.5 mg/dL    Total Protein 8.5 (H) 6.0 - 8.4 g/dL    Albumin 4.0 3.5 - 5.2 g/dL    Total Bilirubin 0.3 0.1 - 1.0 mg/dL    Alkaline Phosphatase 82 55 - 135 U/L    AST 16 10 - 40 U/L    ALT 12 10 - 44 U/L    Anion Gap 12 8 - 16 mmol/L    eGFR if African American 33 (A) >60 mL/min/1.73 m^2    eGFR if non African American 29 (A) >60 mL/min/1.73 m^2   Troponin I   Result Value Ref Range    Troponin I 0.016 0.000 - 0.026 ng/mL       Imaging Results:  Imaging Results    None        The EKG was ordered, reviewed, and independently interpreted by the ED provider.  Interpretation time: 1639  Rate: 77 BPM  Rhythm: normal sinus rhythm  Interpretation: LVH. Nonspecific. No STEMI.         The Emergency Provider reviewed the vital signs and test results, which are outlined above.    ED Discussion     6:00 PM: Dr. Haney (PCP) at bedside. Dr. Haney recommends discharge patient and will f/u with patient tomorrow in clinic.    6:16 PM: Reassessed pt at this time. Patient is resting comfortably with no complaints. Patient states she is ready to be discharged. Discussed with pt all pertinent ED information and results. Discussed pt dx and plan of tx. Gave pt all f/u and return to the ED instructions. All questions and concerns were addressed at this time. Pt expresses understanding of information and instructions, and is comfortable with plan to discharge. Pt is stable for discharge.  Discussed with patient to f/u with PCP tomorrow as previously discussed with Dr. Haney.    I discussed with patient and/or family/caretaker that evaluation in the ED does not suggest any emergent or life threatening medical conditions requiring immediate intervention beyond what was provided in the ED, and I believe patient is safe for discharge.  Regardless, an unremarkable evaluation in the ED does not preclude the development or presence of a serious  of life threatening condition. As such, patient was instructed to return immediately for any worsening or change in current symptoms.      ED Medication(s):  Medications   hydrALAZINE injection 10 mg (10 mg Intravenous Given 7/9/18 1655)   diphenhydrAMINE injection 25 mg (25 mg Intravenous Given 7/9/18 1656)   labetalol injection 20 mg (20 mg Intravenous Given 7/9/18 1739)   amLODIPine tablet 10 mg (10 mg Oral Given 7/9/18 1820)       New Prescriptions    No medications on file       Follow-up Information     Jack Haney MD.    Specialty:  Family Medicine  Why:  call office in morning as discussed to be seen for recheck tomorrow, return sooner if problem  Contact information:  [address] [facility] [address] [phone]                     Medical Decision Making    Medical Decision Making:   Clinical Tests:   Lab Tests: Ordered and Reviewed  Medical Tests: Ordered and Reviewed           Scribe Attestation:   Scribe #1: I performed the above scribed service and the documentation accurately describes the services I performed. I attest to the accuracy of the note.    Attending:   Physician Attestation Statement for Scribe #1: I, Keyshawn Torres DO, personally performed the services described in this documentation, as scribed by Argentina Casper, in my presence, and it is both accurate and complete.          Clinical Impression       ICD-10-CM ICD-9-CM   1. Hypertension I10 401.9   2. Renal insufficiency N28.9 593.9       Disposition:   Disposition: Discharged  Condition: Stable         Keyshawn Torres DO  07/09/18 1821

## 2018-07-09 NOTE — PROGRESS NOTES
Subjective:       Patient ID: Rebeca Woodard is a 68 y.o. female.    Chief Complaint: Establish Care    HPI     67 yo F    Presents for pre cancer discussion.  Noted BP severly elevated  Nurse had her sit quitely.  When I came into room BP was 250s-260/ 90s-100s.    Reported HA.  Reported some vision change.    At a DrSarah Office visit earlier today at outside facility they were unable to read her BP.      Review of Systems   Constitutional: Negative for chills and fever.   HENT: Negative for trouble swallowing.    Eyes: Positive for pain and visual disturbance.   Respiratory: Negative for shortness of breath.    Cardiovascular: Negative for chest pain and palpitations.   Gastrointestinal: Negative for constipation and diarrhea.   Genitourinary: Negative for dyspareunia and dysuria.   Musculoskeletal: Negative for gait problem.   Neurological: Positive for headaches. Negative for dizziness.       Objective:       Vitals:    07/09/18 1518   BP: (!) 255/90   Pulse:    Resp:    Temp:    .    Physical Exam   Constitutional: She is oriented to person, place, and time. She appears well-developed and well-nourished. She does not have a sickly appearance. No distress.   HENT:   Head: Normocephalic and atraumatic.   Right Ear: External ear normal.   Left Ear: External ear normal.   Eyes: Conjunctivae, EOM and lids are normal.   Neck: Trachea normal, normal range of motion and full passive range of motion without pain.   Cardiovascular: Normal rate, regular rhythm and normal heart sounds.    Pulmonary/Chest: Effort normal and breath sounds normal. No stridor. No respiratory distress.   Abdominal: Soft. Normal appearance and bowel sounds are normal. She exhibits no distension. There is no tenderness. There is no guarding. No hernia.   Musculoskeletal: Normal range of motion.   Lymphadenopathy:     She has no cervical adenopathy.   Neurological: She is alert and oriented to person, place, and time. She is not disoriented.    Skin: Skin is warm, dry and intact. No rash noted. She is not diaphoretic.   Psychiatric: She has a normal mood and affect. Her speech is normal and behavior is normal. Thought content normal.       Assessment:       1. Hypertension, unspecified type        Plan:   Hypertension, unspecified type    Severely elevated HTN  Has had admission/ ER visits in Past.  Patient was resting in dark when I came into room and discovered BP elevated as such.  On several rechecks BP was 250-260s/90-100s.  Given she was having headache and did comment on vision change I feel it is necessitating immediate treatment and monitoring in Emergency department. Patient sent with daughter directly to hospital. Called an discussed with ER.     No Follow-up on file.

## 2018-07-09 NOTE — ED NOTES
Pt sent here from PCP because of elevated bp. Pt denies cp/sob. Pt only c.o is headache on both sides she describes it as pressure. Pt resp e/u nad

## 2018-07-10 ENCOUNTER — OFFICE VISIT (OUTPATIENT)
Dept: INTERNAL MEDICINE | Facility: CLINIC | Age: 68
End: 2018-07-10
Payer: MEDICARE

## 2018-07-10 ENCOUNTER — TELEPHONE (OUTPATIENT)
Dept: INTERNAL MEDICINE | Facility: CLINIC | Age: 68
End: 2018-07-10

## 2018-07-10 VITALS
HEART RATE: 95 BPM | DIASTOLIC BLOOD PRESSURE: 68 MMHG | TEMPERATURE: 97 F | SYSTOLIC BLOOD PRESSURE: 162 MMHG | BODY MASS INDEX: 20.24 KG/M2 | OXYGEN SATURATION: 99 % | RESPIRATION RATE: 18 BRPM | WEIGHT: 110.69 LBS

## 2018-07-10 DIAGNOSIS — Z87.891 FORMER HEAVY CIGARETTE SMOKER (20-39 PER DAY): ICD-10-CM

## 2018-07-10 DIAGNOSIS — F41.9 ANXIETY: ICD-10-CM

## 2018-07-10 DIAGNOSIS — Z86.73 HISTORY OF STROKE: ICD-10-CM

## 2018-07-10 DIAGNOSIS — Z79.899 HIGH RISK MEDICATION USE: ICD-10-CM

## 2018-07-10 DIAGNOSIS — I10 HYPERTENSION, UNSPECIFIED TYPE: Primary | ICD-10-CM

## 2018-07-10 DIAGNOSIS — N17.9 ACUTE KIDNEY INJURY: ICD-10-CM

## 2018-07-10 DIAGNOSIS — C10.9 OROPHARYNGEAL CANCER: ICD-10-CM

## 2018-07-10 DIAGNOSIS — Z85.038 HISTORY OF COLON CANCER: ICD-10-CM

## 2018-07-10 DIAGNOSIS — N17.9 AKI (ACUTE KIDNEY INJURY): Primary | ICD-10-CM

## 2018-07-10 PROCEDURE — 99999 PR PBB SHADOW E&M-EST. PATIENT-LVL III: CPT | Mod: PBBFAC,,, | Performed by: FAMILY MEDICINE

## 2018-07-10 PROCEDURE — 99214 OFFICE O/P EST MOD 30 MIN: CPT | Mod: S$PBB,,, | Performed by: FAMILY MEDICINE

## 2018-07-10 PROCEDURE — 99213 OFFICE O/P EST LOW 20 MIN: CPT | Mod: PBBFAC | Performed by: FAMILY MEDICINE

## 2018-07-10 RX ORDER — CARVEDILOL 6.25 MG/1
TABLET ORAL
Qty: 180 TABLET | Refills: 0 | Status: SHIPPED | OUTPATIENT
Start: 2018-07-10 | End: 2018-08-27 | Stop reason: DRUGHIGH

## 2018-07-10 RX ORDER — ROSUVASTATIN CALCIUM 20 MG/1
20 TABLET, COATED ORAL DAILY
Qty: 90 TABLET | Refills: 1 | Status: SHIPPED | OUTPATIENT
Start: 2018-07-10 | End: 2019-01-10

## 2018-07-10 RX ORDER — LORAZEPAM 1 MG/1
1 TABLET ORAL EVERY 6 HOURS PRN
COMMUNITY
End: 2019-01-10

## 2018-07-10 RX ORDER — CARVEDILOL 6.25 MG/1
6.25 TABLET ORAL 2 TIMES DAILY WITH MEALS
Qty: 60 TABLET | Refills: 0 | Status: SHIPPED | OUTPATIENT
Start: 2018-07-10 | End: 2018-07-10 | Stop reason: SDUPTHER

## 2018-07-10 NOTE — PROGRESS NOTES
Subjective:       Patient ID: Rebeca Woodard is a 68 y.o. female.    Chief Complaint: No chief complaint on file.    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 -            Invasive moderatly differentiated SCC.  -HLD  -HTN  -Stroke 2012ish   -Hep C treated  -PAD - stents in legs  -Carotid Stent - following - [facility] - Cleared for radiation.   -H/o Colon Cancer 2007-08 s/p chemo / radiation / surgery - partial colectomy  -Reports she has stent in her Kidney. (Uncertain why )     Seen yesterday in clinic  Found to have severely elevated BP (systolic 260 in office. Given HA and vision changed sent to ER)      D/c home after BP reduced.  Given amlodipine there    Current BP medications    Lisinopril 40 mg.  Amlodipine 10 mg ( she had stopped taking this a few weeks ago b/c pressure was good - I am uncertain how long BP has been uncontrolle).      Noted Renal function obtained in ER.   Cr 1.8  GFR 29mg  This  Drop from 9/17 - ( 10 months ago  ) - 1.0 / 59.3         CBC improved. WNL  Troponin Normal    PET scan is due July 23th.    Patient is waiting for [facility] to get back teeth pulled prior to Chemo-radiation.      Has not been taking Crestor.  Uncertain why stopped.      Review of Systems   Constitutional: Negative for chills and fever.   HENT: Negative for congestion, sinus pressure and voice change.    Eyes: Positive for visual disturbance.        Some blurry vision   Respiratory: Negative for shortness of breath.    Cardiovascular: Negative for chest pain.   Gastrointestinal: Negative for constipation and diarrhea.   Genitourinary: Negative for difficulty urinating and dysuria.   Musculoskeletal: Negative for gait problem and myalgias.   Skin: Negative for rash.   Neurological: Negative for dizziness and light-headedness.   Psychiatric/Behavioral: Negative for dysphoric mood. The patient is not nervous/anxious.        Objective:       Vitals:    07/10/18 1352   BP: (!) 162/68   Pulse:  95   Resp: 18   Temp: 96.9 °F (36.1 °C)       Physical Exam   Constitutional: She is oriented to person, place, and time. She appears well-developed and well-nourished. She does not have a sickly appearance. No distress.   HENT:   Head: Normocephalic and atraumatic.   Right Ear: External ear normal.   Left Ear: External ear normal.   Eyes: Conjunctivae, EOM and lids are normal.   Neck: Trachea normal, normal range of motion and full passive range of motion without pain.   Cardiovascular: Normal rate, regular rhythm and normal heart sounds.    Pulmonary/Chest: Effort normal and breath sounds normal. No stridor. No respiratory distress.   Abdominal: Soft. Normal appearance and bowel sounds are normal. She exhibits no distension. There is no tenderness. There is no guarding. No hernia.   Musculoskeletal: Normal range of motion.   Lymphadenopathy:     She has no cervical adenopathy.   Neurological: She is alert and oriented to person, place, and time. She is not disoriented.   Skin: Skin is warm, dry and intact. No rash noted. She is not diaphoretic.   Psychiatric: She has a normal mood and affect. Her speech is normal and behavior is normal. Thought content normal.       Assessment:       1. Hypertension, unspecified type    2. Oropharyngeal cancer    3. Acute kidney injury    4. Former heavy cigarette smoker (20-39 per day)    5. History of stroke    6. High risk medication use    7. History of colon cancer        Plan:   Hypertension, unspecified type    On Lisinopril 40 mg for long time.  On Amlodipine 10 mg - will continue  Not yet at goal.  Starting Coreg today.   F/u in 1 week for review    Acute kidney injury  My concern was this was directly related to her severely elevated BP yesterday.  She has been on Lisinopril for some time - no change there.  Amlodipine recently added back.  Adding on Coreg today for further BP control - HR 95 today.  Avoid NSAID - discussed importance of this.  Medication review  done.  Plan on obtaining US of kidneys and UA  asap    At end of encounter patient reports a history of Stent in Her kidney in pasat.  She is uncertain of why this was placed in the first.  Given this history I am arranging for a nephrology appointment.     Oropharyngeal cancer  Has upcoming chemo radiation.  Will need to monitor for renal dosing issues.       High risk medication  Has been prescribed Opioid and Benzo by outside physicians  Discussed this firmly and explained risk.     Follow up in 1 week.  We will repeat labs.     H/o Stroke  Patient has not been on a statin  Restarting today.    1 week f/u   Labs - including lipid panel to be done.      No Follow-up on file.

## 2018-07-11 ENCOUNTER — HOSPITAL ENCOUNTER (OUTPATIENT)
Dept: RADIOLOGY | Facility: HOSPITAL | Age: 68
Discharge: HOME | End: 2018-07-11
Attending: FAMILY MEDICINE
Payer: MEDICARE

## 2018-07-11 DIAGNOSIS — N17.9 ACUTE KIDNEY INJURY: ICD-10-CM

## 2018-07-11 PROCEDURE — 76770 US EXAM ABDO BACK WALL COMP: CPT | Mod: TC

## 2018-07-11 PROCEDURE — 76770 US EXAM ABDO BACK WALL COMP: CPT | Mod: 26,,, | Performed by: RADIOLOGY

## 2018-07-12 ENCOUNTER — TELEPHONE (OUTPATIENT)
Dept: INTERNAL MEDICINE | Facility: CLINIC | Age: 68
End: 2018-07-12

## 2018-07-12 DIAGNOSIS — E87.5 HYPERKALEMIA: Primary | ICD-10-CM

## 2018-07-12 RX ORDER — CIPROFLOXACIN 250 MG/1
250 TABLET, FILM COATED ORAL EVERY 12 HOURS
Qty: 14 TABLET | Refills: 0 | Status: SHIPPED | OUTPATIENT
Start: 2018-07-12 | End: 2018-07-19

## 2018-07-12 NOTE — PROGRESS NOTES
Subjective:       Patient ID: Rebeca Woodard is a 68 y.o. female.    Chief Complaint: No chief complaint on file.    HPI  Review of Systems    Objective:      Physical Exam    Assessment:       1. Hyperkalemia        Plan:   Hyperkalemia  -     Comprehensive metabolic panel; Future; Expected date: 07/12/2018    Other orders  -     ciprofloxacin HCl (CIPRO) 250 MG tablet; Take 1 tablet (250 mg total) by mouth every 12 (twelve) hours. for 7 days  Dispense: 14 tablet; Refill: 0        Noted UA  Concern of infection  Low dose cipro    Messaging renal given further decline, infection, and abnormal imaging prior to ordering CT w/ contrast.    Noted hyper K  Recheck ASAP.  Considered kayexalate - but renal concern    Patient feeling stable / sounds good.     No Follow-up on file.

## 2018-07-13 ENCOUNTER — HOSPITAL ENCOUNTER (EMERGENCY)
Facility: HOSPITAL | Age: 68
Discharge: HOME | End: 2018-07-13
Attending: EMERGENCY MEDICINE
Payer: MEDICARE

## 2018-07-13 ENCOUNTER — TELEPHONE (OUTPATIENT)
Dept: NEPHROLOGY | Facility: CLINIC | Age: 68
End: 2018-07-13

## 2018-07-13 VITALS
HEART RATE: 81 BPM | TEMPERATURE: 97 F | RESPIRATION RATE: 18 BRPM | DIASTOLIC BLOOD PRESSURE: 70 MMHG | SYSTOLIC BLOOD PRESSURE: 162 MMHG | OXYGEN SATURATION: 99 %

## 2018-07-13 DIAGNOSIS — N17.9 ACUTE KIDNEY INJURY: ICD-10-CM

## 2018-07-13 DIAGNOSIS — I10 ESSENTIAL HYPERTENSION: ICD-10-CM

## 2018-07-13 DIAGNOSIS — E87.5 HYPERKALEMIA: ICD-10-CM

## 2018-07-13 DIAGNOSIS — N39.0 URINARY TRACT INFECTION WITHOUT HEMATURIA, SITE UNSPECIFIED: Primary | ICD-10-CM

## 2018-07-13 LAB
ALBUMIN SERPL BCP-MCNC: 3.8 G/DL
ALP SERPL-CCNC: 77 U/L
ALT SERPL W/O P-5'-P-CCNC: 10 U/L
ANION GAP SERPL CALC-SCNC: 9 MMOL/L
AST SERPL-CCNC: 15 U/L
BACTERIA #/AREA URNS HPF: ABNORMAL /HPF
BASOPHILS # BLD AUTO: 0.02 K/UL
BASOPHILS NFR BLD: 0.3 %
BILIRUB SERPL-MCNC: 0.6 MG/DL
BILIRUB UR QL STRIP: NEGATIVE
BUN SERPL-MCNC: 34 MG/DL
CALCIUM SERPL-MCNC: 10 MG/DL
CHLORIDE SERPL-SCNC: 108 MMOL/L
CLARITY UR: CLEAR
CO2 SERPL-SCNC: 20 MMOL/L
COLOR UR: YELLOW
CREAT SERPL-MCNC: 1.9 MG/DL
CREAT UR-MCNC: 69.4 MG/DL
CREAT UR-MCNC: 69.4 MG/DL
DIFFERENTIAL METHOD: ABNORMAL
EOSINOPHIL # BLD AUTO: 0.3 K/UL
EOSINOPHIL NFR BLD: 4.1 %
ERYTHROCYTE [DISTWIDTH] IN BLOOD BY AUTOMATED COUNT: 14.5 %
EST. GFR  (AFRICAN AMERICAN): 31 ML/MIN/1.73 M^2
EST. GFR  (NON AFRICAN AMERICAN): 27 ML/MIN/1.73 M^2
GLUCOSE SERPL-MCNC: 100 MG/DL
GLUCOSE UR QL STRIP: NEGATIVE
HCT VFR BLD AUTO: 37.4 %
HGB BLD-MCNC: 12.3 G/DL
HGB UR QL STRIP: ABNORMAL
HYALINE CASTS #/AREA URNS LPF: 0 /LPF
KETONES UR QL STRIP: NEGATIVE
LEUKOCYTE ESTERASE UR QL STRIP: ABNORMAL
LYMPHOCYTES # BLD AUTO: 1.1 K/UL
LYMPHOCYTES NFR BLD: 15.9 %
MCH RBC QN AUTO: 28 PG
MCHC RBC AUTO-ENTMCNC: 32.9 G/DL
MCV RBC AUTO: 85 FL
MICROSCOPIC COMMENT: ABNORMAL
MONOCYTES # BLD AUTO: 0.5 K/UL
MONOCYTES NFR BLD: 6.6 %
NEUTROPHILS # BLD AUTO: 5.2 K/UL
NEUTROPHILS NFR BLD: 73.1 %
NITRITE UR QL STRIP: POSITIVE
PH UR STRIP: 6 [PH] (ref 5–8)
PLATELET # BLD AUTO: 206 K/UL
PMV BLD AUTO: 10.1 FL
POTASSIUM SERPL-SCNC: 5.3 MMOL/L
PROCALCITONIN SERPL IA-MCNC: 0.13 NG/ML
PROT SERPL-MCNC: 7.9 G/DL
PROT UR QL STRIP: ABNORMAL
PROT UR-MCNC: 86 MG/DL
PROT/CREAT RATIO, UR: 1.24
RBC # BLD AUTO: 4.4 M/UL
RBC #/AREA URNS HPF: 6 /HPF (ref 0–4)
SODIUM SERPL-SCNC: 137 MMOL/L
SODIUM UR-SCNC: 70 MMOL/L
SP GR UR STRIP: 1.02 (ref 1–1.03)
SQUAMOUS #/AREA URNS HPF: 2 /HPF
URN SPEC COLLECT METH UR: ABNORMAL
UROBILINOGEN UR STRIP-ACNC: NEGATIVE EU/DL
WBC # BLD AUTO: 7.16 K/UL
WBC #/AREA URNS HPF: >100 /HPF (ref 0–5)
YEAST URNS QL MICRO: ABNORMAL

## 2018-07-13 PROCEDURE — 96361 HYDRATE IV INFUSION ADD-ON: CPT

## 2018-07-13 PROCEDURE — 36415 COLL VENOUS BLD VENIPUNCTURE: CPT

## 2018-07-13 PROCEDURE — 93005 ELECTROCARDIOGRAM TRACING: CPT

## 2018-07-13 PROCEDURE — 87086 URINE CULTURE/COLONY COUNT: CPT

## 2018-07-13 PROCEDURE — 96375 TX/PRO/DX INJ NEW DRUG ADDON: CPT

## 2018-07-13 PROCEDURE — 25000003 PHARM REV CODE 250: Performed by: EMERGENCY MEDICINE

## 2018-07-13 PROCEDURE — 84145 PROCALCITONIN (PCT): CPT

## 2018-07-13 PROCEDURE — 84156 ASSAY OF PROTEIN URINE: CPT

## 2018-07-13 PROCEDURE — 93010 ELECTROCARDIOGRAM REPORT: CPT | Mod: ,,, | Performed by: INTERNAL MEDICINE

## 2018-07-13 PROCEDURE — 99285 EMERGENCY DEPT VISIT HI MDM: CPT | Mod: 25

## 2018-07-13 PROCEDURE — 63600175 PHARM REV CODE 636 W HCPCS: Performed by: EMERGENCY MEDICINE

## 2018-07-13 PROCEDURE — 85025 COMPLETE CBC W/AUTO DIFF WBC: CPT

## 2018-07-13 PROCEDURE — 84300 ASSAY OF URINE SODIUM: CPT

## 2018-07-13 PROCEDURE — 80053 COMPREHEN METABOLIC PANEL: CPT

## 2018-07-13 PROCEDURE — 81000 URINALYSIS NONAUTO W/SCOPE: CPT

## 2018-07-13 PROCEDURE — 96365 THER/PROPH/DIAG IV INF INIT: CPT

## 2018-07-13 RX ORDER — ONDANSETRON 2 MG/ML
4 INJECTION INTRAMUSCULAR; INTRAVENOUS
Status: COMPLETED | OUTPATIENT
Start: 2018-07-13 | End: 2018-07-13

## 2018-07-13 RX ADMIN — ONDANSETRON 4 MG: 2 INJECTION, SOLUTION INTRAMUSCULAR; INTRAVENOUS at 08:07

## 2018-07-13 RX ADMIN — CEFTRIAXONE 1 G: 1 INJECTION, SOLUTION INTRAVENOUS at 12:07

## 2018-07-13 RX ADMIN — SODIUM CHLORIDE 500 ML: 0.9 INJECTION, SOLUTION INTRAVENOUS at 08:07

## 2018-07-13 NOTE — TELEPHONE ENCOUNTER
Called. Discussed.  Encouraged patient to present to ER in am.  Discussed case with Neph.  Will proceed w/ inpatient work up.  Patient aware and understood.

## 2018-07-13 NOTE — ED PROVIDER NOTES
SCRIBE #1 NOTE: I, Corinne Mack, am scribing for, and in the presence of, Mikey Young MD. I have scribed the entire note.      History      Chief Complaint   Patient presents with    abnormal labs     pt sent for admission per Dr. Llanos for abnormal kidney function       Review of patient's allergies indicates:  No Known Allergies     HPI   HPI    7/13/2018, 7:23 AM   History obtained from the patient      History of Present Illness: Rebeca Woodard is a 68 y.o. female patient with PMHx of anxiety, Hep C, HTN, and colon cancer who was sent to the Emergency Department by Dr. Haney ([facility]) for further evaluation and Tx of SOHAIL. Pt was seen at this facility on 07/09/18 for HTN and was d/c home. Pt f/u with Dr. Haney on 07/10/18 and had labs drawn. Dr. Haney reports that the pt's creatinine has increased since her last visit in the ED and her potassium is 5.8. Dr. Haney consulted Dr. Reese (Nephorology) who recommended that the pt present to the ED for further evaluation of and admission for SOHAIL. Pt states that she has had recent stress from her throat cancer Dx a few weeks ago. Pt has not started radiation therapy yet and has no complaints at this time. No associated sxs. Patient denies any fever, chills, CP, SOB, sore throat, N/V/D, back pain, neck pain, HA, and all other sxs at this time. No prior Tx. No further complaints or concerns at this time.         Arrival mode: Personal vehicle    PCP: Jack Haney MD       Past Medical History:  Past Medical History:   Diagnosis Date    Anxiety     Carotid artery disease 9/18/2017    Colon cancer     Disorder of kidney and ureter     Hepatitis C     Hyperlipidemia     Hypertension     Stroke        Past Surgical History:  Past Surgical History:   Procedure Laterality Date    COLON SURGERY      DILATION AND CURETTAGE OF UTERUS      missed ab    FEMORAL ARTERY STENT      bilateral    LUNG LOBECTOMY      left    OVARIAN CYST DRAINAGE      RENAL  "ARTERY STENT           Family History:  Family History   Problem Relation Age of Onset    COPD Mother     Cancer Father     Breast cancer Neg Hx     Colon cancer Neg Hx     Ovarian cancer Neg Hx     Thrombosis Neg Hx        Social History:  Social History     Social History Main Topics    Smoking status: Former Smoker     Packs/day: 1.00     Years: 50.00     Types: Cigarettes     Quit date: 9/4/2016    Smokeless tobacco: Former User    Alcohol use Yes      Comment: "2-3 beers daily or every other day"    Drug use: Yes     Types: Marijuana      Comment: occasionally    Sexual activity: Not Currently       ROS   Review of Systems   Constitutional: Negative for chills and fever.   HENT: Negative for sore throat and trouble swallowing.    Respiratory: Negative for cough and shortness of breath.    Cardiovascular: Negative for chest pain and leg swelling.        (+) HTN   Gastrointestinal: Negative for abdominal pain, diarrhea, nausea and vomiting.   Musculoskeletal: Negative for back pain, neck pain and neck stiffness.   Skin: Negative for rash and wound.   Neurological: Negative for dizziness, light-headedness, numbness and headaches.   All other systems reviewed and are negative.    Physical Exam      Initial Vitals   BP Pulse Resp Temp SpO2   07/13/18 0730 07/13/18 0730 07/13/18 0730 07/13/18 0733 07/13/18 0730   (!) 174/79 79 18 97 °F (36.1 °C) 99 %      MAP       --                 Physical Exam  Nursing Notes and Vital Signs Reviewed.  Constitutional: Patient is in no apparent distress. Well-developed and well-nourished.  Head: Atraumatic. Normocephalic.  Eyes: PERRL. EOM intact. Conjunctivae are not pale. No scleral icterus.  ENT: Mucous membranes are moist. Oropharynx is clear and symmetric.    Neck: Supple. Full ROM. No lymphadenopathy.  Cardiovascular: Regular rate. Regular rhythm. No murmurs, rubs, or gallops. Distal pulses are 2+ and symmetric.  Pulmonary/Chest: No respiratory distress. Clear to " auscultation bilaterally. No wheezing or rales.  Abdominal: Soft and non-distended.  There is no tenderness.  No rebound, guarding, or rigidity.   Musculoskeletal: Moves all extremities. No obvious deformities.  Skin: Warm and dry.  Neurological:  Alert, awake, and appropriate.  Normal speech.  No acute focal neurological deficits are appreciated.  Psychiatric: Normal affect. Good eye contact. Appropriate in content.    ED Course    Procedures  ED Vital Signs:  Vitals:    07/13/18 0730 07/13/18 0733 07/13/18 1421   BP: (!) 174/79  (!) 162/70   Pulse: 79  81   Resp: 18  18   Temp:  97 °F (36.1 °C) 97.4 °F (36.3 °C)   TempSrc:  Oral Oral   SpO2: 99%  99%       Abnormal Lab Results:  Labs Reviewed   CBC W/ AUTO DIFFERENTIAL - Abnormal; Notable for the following:        Result Value    Gran% 73.1 (*)     Lymph% 15.9 (*)     All other components within normal limits   COMPREHENSIVE METABOLIC PANEL - Abnormal; Notable for the following:     Potassium 5.3 (*)     CO2 20 (*)     BUN, Bld 34 (*)     Creatinine 1.9 (*)     eGFR if  31 (*)     eGFR if non  27 (*)     All other components within normal limits   URINALYSIS - Abnormal; Notable for the following:     Protein, UA 1+ (*)     Occult Blood UA 3+ (*)     Nitrite, UA Positive (*)     Leukocytes, UA 3+ (*)     All other components within normal limits   PROTEIN / CREATININE RATIO, URINE - Abnormal; Notable for the following:     Protein, Urine Random 86 (*)     Prot/Creat Ratio, Ur 1.24 (*)     All other components within normal limits   URINALYSIS MICROSCOPIC - Abnormal; Notable for the following:     RBC, UA 6 (*)     WBC, UA >100 (*)     All other components within normal limits   CULTURE, URINE   SODIUM, URINE, RANDOM   CREATININE, URINE, RANDOM   PROCALCITONIN   PROCALCITONIN        All Lab Results:  Results for orders placed or performed during the hospital encounter of 07/13/18   CBC auto differential   Result Value Ref Range    WBC  7.16 3.90 - 12.70 K/uL    RBC 4.40 4.00 - 5.40 M/uL    Hemoglobin 12.3 12.0 - 16.0 g/dL    Hematocrit 37.4 37.0 - 48.5 %    MCV 85 82 - 98 fL    MCH 28.0 27.0 - 31.0 pg    MCHC 32.9 32.0 - 36.0 g/dL    RDW 14.5 11.5 - 14.5 %    Platelets 206 150 - 350 K/uL    MPV 10.1 9.2 - 12.9 fL    Gran # (ANC) 5.2 1.8 - 7.7 K/uL    Lymph # 1.1 1.0 - 4.8 K/uL    Mono # 0.5 0.3 - 1.0 K/uL    Eos # 0.3 0.0 - 0.5 K/uL    Baso # 0.02 0.00 - 0.20 K/uL    Gran% 73.1 (H) 38.0 - 73.0 %    Lymph% 15.9 (L) 18.0 - 48.0 %    Mono% 6.6 4.0 - 15.0 %    Eosinophil% 4.1 0.0 - 8.0 %    Basophil% 0.3 0.0 - 1.9 %    Differential Method Automated    Comprehensive metabolic panel   Result Value Ref Range    Sodium 137 136 - 145 mmol/L    Potassium 5.3 (H) 3.5 - 5.1 mmol/L    Chloride 108 95 - 110 mmol/L    CO2 20 (L) 23 - 29 mmol/L    Glucose 100 70 - 110 mg/dL    BUN, Bld 34 (H) 8 - 23 mg/dL    Creatinine 1.9 (H) 0.5 - 1.4 mg/dL    Calcium 10.0 8.7 - 10.5 mg/dL    Total Protein 7.9 6.0 - 8.4 g/dL    Albumin 3.8 3.5 - 5.2 g/dL    Total Bilirubin 0.6 0.1 - 1.0 mg/dL    Alkaline Phosphatase 77 55 - 135 U/L    AST 15 10 - 40 U/L    ALT 10 10 - 44 U/L    Anion Gap 9 8 - 16 mmol/L    eGFR if African American 31 (A) >60 mL/min/1.73 m^2    eGFR if non African American 27 (A) >60 mL/min/1.73 m^2   Urinalysis - Clean Catch   Result Value Ref Range    Specimen UA Urine, Clean Catch     Color, UA Yellow Yellow, Straw, Kat[place]    Appearance, UA Clear Clear    pH, UA 6.0 5.0 - 8.0    Specific Gravity, UA 1.020 1.005 - 1.030    Protein, UA 1+ (A) Negative    Glucose, UA Negative Negative    Ketones, UA Negative Negative    Bilirubin (UA) Negative Negative    Occult Blood UA 3+ (A) Negative    Nitrite, UA Positive (A) Negative    Urobilinogen, UA Negative <2.0 EU/dL    Leukocytes, UA 3+ (A) Negative   Sodium, urine, random   Result Value Ref Range    Sodium Urine Random 70 20 - 250 mmol/L   Protein / creatinine ratio, urine   Result Value Ref Range    Protein, Urine  Random 86 (H) 0 - 15 mg/dL    Creatinine, Random Ur 69.4 15.0 - 325.0 mg/dL    Prot/Creat Ratio, Ur 1.24 (H) 0.00 - 0.20   Creatinine, urine, random   Result Value Ref Range    Creatinine, Random Ur 69.4 15.0 - 325.0 mg/dL   Procalcitonin   Result Value Ref Range    Procalcitonin 0.13 <0.25 ng/mL   Urinalysis Microscopic   Result Value Ref Range    RBC, UA 6 (H) 0 - 4 /hpf    WBC, UA >100 (H) 0 - 5 /hpf    Bacteria, UA Occasional None-Occ /hpf    Yeast, UA None None    Squam Epithel, UA 2 /hpf    Hyaline Casts, UA 0 0-1/lpf /lpf    Microscopic Comment SEE COMMENT        Imaging Results:  Imaging Results          X-Ray Chest PA And Lateral (Final result)  Result time 07/13/18 08:16:28    Final result by Julio César Ye MD (07/13/18 08:16:28)                 Impression:      No acute process seen.      Electronically signed by: Julio César Ye MD  Date:    07/13/2018  Time:    08:16             Narrative:    EXAMINATION:  XR CHEST PA AND LATERAL    CLINICAL HISTORY:  Chest Pain;    COMPARISON:  None    FINDINGS:  Cardiac silhouette is normal.  The lungs demonstrate no evidence of active disease.  Postoperative changes are suspected within the left midlung zone.  Aorta demonstrates atherosclerotic disease.  No evidence of pleural effusion or pneumothorax.  Bones demonstrate mild degenerative changes..                               The EKG was ordered, reviewed, and independently interpreted by the ED provider.  Interpretation time: 0809  Rate: 69 BPM  Rhythm: normal sinus rhythm  Interpretation: No STEMI.           The Emergency Provider reviewed the vital signs and test results, which are outlined above.    ED Discussion     9:42 AM: Dr. Young discussed the pt's case with Dr. Reese (Nephrology) who recommends getting a renal and aortic US. If the results are negative, the pt can be d/c home.    12:11 PM: Reassessed pt at this time. Pt is awake, alert, and in no distress. Informed pt that we are unable to do the renal US  because she needs to be fasting. An out-pt US will be scheduled, which the pt is amenable to. Pt states she is feeling better and is comfortable with going home. Dr. Haney (Family Medicine) and Dr. Reese (Nephrology) also agree with the current plan of care. Dr. Haney would like to keep the pt on Cirpo at this time.  We have a urine culture pending that they will follow up on. Discussed with pt all pertinent ED information and results. Discussed pt dx and plan of tx. Gave pt all f/u and return to the ED instructions. All questions and concerns were addressed at this time. Pt expresses understanding of information and instructions, and is comfortable with plan to discharge. Pt is stable for discharge.    I discussed with patient and/or family/caretaker that evaluation in the ED does not suggest any emergent or life threatening medical conditions requiring immediate intervention beyond what was provided in the ED, and I believe patient is safe for discharge.  Regardless, an unremarkable evaluation in the ED does not preclude the development or presence of a serious of life threatening condition. As such, patient was instructed to return immediately for any worsening or change in current symptoms.      ED Medication(s):  Medications   sodium chloride 0.9% bolus 500 mL (0 mLs Intravenous Stopped 7/13/18 0938)   ondansetron injection 4 mg (4 mg Intravenous Given 7/13/18 0801)   cefTRIAXone (ROCEPHIN) 1 g in dextrose 5 % 50 mL IVPB (0 g Intravenous Stopped 7/13/18 1242)       Discharge Medication List as of 7/13/2018  2:21 PM          Follow-up Information     Jack Haney MD. Schedule an appointment as soon as possible for a visit in 3 days.    Specialty:  Family Medicine  Why:  Follow-up with her primary care doctor the next 2-3 days for recheck.  Return to the emergency department for any worsening signs or symptoms.  Contact information:  [address] [facility] [address] [phone]                      Medical Decision Making    Medical Decision Making:   Clinical Tests:   Lab Tests: Ordered and Reviewed  Radiological Study: Ordered and Reviewed  Medical Tests: Ordered and Reviewed           Scribe Attestation:   Scribe #1: I performed the above scribed service and the documentation accurately describes the services I performed. I attest to the accuracy of the note.    Attending:   Physician Attestation Statement for Scribe #1: I, Mikey Young MD, personally performed the services described in this documentation, as scribed by Corinne Mack, in my presence, and it is both accurate and complete.          Clinical Impression       ICD-10-CM ICD-9-CM   1. Urinary tract infection without hematuria, site unspecified N39.0 599.0   2. Hyperkalemia E87.5 276.7   3. Acute kidney injury N17.9 584.9   4. Essential hypertension I10 401.9       Disposition:   Disposition: Discharged  Condition: Stable           Mikey Young MD  07/14/18 0939       Mikey Young MD  07/14/18 0927

## 2018-07-13 NOTE — TELEPHONE ENCOUNTER
----- Message from Nola Reese MD sent at 7/13/2018 12:09 PM CDT -----    Patient was seen in ER     Please accommodate for follow up appointment after d/c from ER for next week with routine labs ( repeat labs )    Thanks     Aishwarya Reese MD

## 2018-07-14 LAB — BACTERIA UR CULT: NORMAL

## 2018-07-16 ENCOUNTER — HOSPITAL ENCOUNTER (OUTPATIENT)
Dept: RADIOLOGY | Facility: HOSPITAL | Age: 68
Discharge: HOME | End: 2018-07-16
Attending: INTERNAL MEDICINE
Payer: MEDICARE

## 2018-07-16 PROCEDURE — 76770 US EXAM ABDO BACK WALL COMP: CPT | Mod: TC

## 2018-07-17 ENCOUNTER — OFFICE VISIT (OUTPATIENT)
Dept: INTERNAL MEDICINE | Facility: CLINIC | Age: 68
End: 2018-07-17
Payer: MEDICARE

## 2018-07-17 ENCOUNTER — OFFICE VISIT (OUTPATIENT)
Dept: NEPHROLOGY | Facility: CLINIC | Age: 68
End: 2018-07-17
Payer: MEDICARE

## 2018-07-17 VITALS
DIASTOLIC BLOOD PRESSURE: 52 MMHG | BODY MASS INDEX: 20.32 KG/M2 | SYSTOLIC BLOOD PRESSURE: 132 MMHG | TEMPERATURE: 97 F | HEART RATE: 86 BPM | RESPIRATION RATE: 18 BRPM | WEIGHT: 111.13 LBS | OXYGEN SATURATION: 98 %

## 2018-07-17 VITALS
BODY MASS INDEX: 20.53 KG/M2 | HEART RATE: 80 BPM | WEIGHT: 111.56 LBS | HEIGHT: 62 IN | DIASTOLIC BLOOD PRESSURE: 60 MMHG | SYSTOLIC BLOOD PRESSURE: 152 MMHG

## 2018-07-17 DIAGNOSIS — R31.9 HEMATURIA SYNDROME: ICD-10-CM

## 2018-07-17 DIAGNOSIS — N17.9 AKI (ACUTE KIDNEY INJURY): Primary | ICD-10-CM

## 2018-07-17 DIAGNOSIS — I70.1 RENAL ARTERY STENOSIS: Primary | ICD-10-CM

## 2018-07-17 DIAGNOSIS — I70.1 RIGHT RENAL ARTERY STENOSIS: ICD-10-CM

## 2018-07-17 DIAGNOSIS — N18.31 CHRONIC KIDNEY DISEASE (CKD) STAGE G3A/A1, MODERATELY DECREASED GLOMERULAR FILTRATION RATE (GFR) BETWEEN 45-59 ML/MIN/1.73 SQUARE METER AND ALBUMINURIA CREATININE RATIO LESS THAN 30 MG/G: ICD-10-CM

## 2018-07-17 DIAGNOSIS — E87.5 HYPERKALEMIA: ICD-10-CM

## 2018-07-17 PROCEDURE — 99999 PR PBB SHADOW E&M-EST. PATIENT-LVL III: CPT | Mod: PBBFAC,,, | Performed by: FAMILY MEDICINE

## 2018-07-17 PROCEDURE — 99213 OFFICE O/P EST LOW 20 MIN: CPT | Mod: PBBFAC,27 | Performed by: FAMILY MEDICINE

## 2018-07-17 PROCEDURE — 99213 OFFICE O/P EST LOW 20 MIN: CPT | Mod: S$PBB,,, | Performed by: FAMILY MEDICINE

## 2018-07-17 PROCEDURE — 99213 OFFICE O/P EST LOW 20 MIN: CPT | Mod: PBBFAC,PO | Performed by: INTERNAL MEDICINE

## 2018-07-17 PROCEDURE — 99204 OFFICE O/P NEW MOD 45 MIN: CPT | Mod: S$PBB,,, | Performed by: INTERNAL MEDICINE

## 2018-07-17 PROCEDURE — 99999 PR PBB SHADOW E&M-EST. PATIENT-LVL III: CPT | Mod: PBBFAC,,, | Performed by: INTERNAL MEDICINE

## 2018-07-17 RX ORDER — DOXAZOSIN 4 MG/1
4 TABLET ORAL NIGHTLY
Qty: 30 TABLET | Refills: 11 | Status: SHIPPED | OUTPATIENT
Start: 2018-07-17 | End: 2019-01-10

## 2018-07-17 NOTE — PROGRESS NOTES
Subjective:       Patient ID: Rebeca Woodard is a 68 y.o. female.    Chief Complaint: Follow-up    HPI     69 yo F    Saw Dr. Reese this morning      7/13/18 US    1. Right renal artery stenosis.  The proximal portion of the right renal artery at its point of takeoff from the abdominal aorta has an arterial waveform with a peak systolic velocity of 492 centimeters/second and a diastolic velocity of 150 centimeters/second.  2. There is partial visualization of a left ureteral stent.  3. There is a mild amount of atherosclerosis within the abdominal aorta.      Stopping Lisinopril  Vascular surgery consulted.    Cardura added.    Currently taking Cipro - 4 left.  No burning or discharge.    Review of Systems   Constitutional: Negative for chills and fever.   HENT: Negative for congestion, sinus pressure and voice change.    Eyes: Negative for visual disturbance.   Respiratory: Negative for shortness of breath.    Cardiovascular: Negative for chest pain.   Gastrointestinal: Negative for constipation and diarrhea.   Genitourinary: Negative for difficulty urinating.   Musculoskeletal: Negative for gait problem and myalgias.   Skin: Negative for rash.   Neurological: Negative for dizziness and light-headedness.   Psychiatric/Behavioral: Negative for dysphoric mood.       Objective:       Vitals:    07/17/18 1616   BP: (!) 132/52   Pulse: 86   Resp: 18   Temp: 96.8 °F (36 °C)       Physical Exam   Constitutional: She is oriented to person, place, and time. She appears well-developed and well-nourished. She does not have a sickly appearance. No distress.   HENT:   Head: Normocephalic and atraumatic.   Right Ear: External ear normal.   Left Ear: External ear normal.   Eyes: Conjunctivae, EOM and lids are normal.   Neck: Trachea normal, normal range of motion and full passive range of motion without pain.   Cardiovascular: Normal rate, regular rhythm and normal heart sounds.    Pulmonary/Chest: Effort normal and breath  sounds normal. No stridor. No respiratory distress.   Abdominal: Soft. Normal appearance and bowel sounds are normal. She exhibits no distension. There is no tenderness. There is no guarding. No hernia.   Musculoskeletal: Normal range of motion.   Lymphadenopathy:     She has no cervical adenopathy.   Neurological: She is alert and oriented to person, place, and time. She is not disoriented.   Skin: Skin is warm, dry and intact. No rash noted. She is not diaphoretic.   Psychiatric: She has a normal mood and affect. Her speech is normal and behavior is normal. Thought content normal.       Assessment:       1. Renal artery stenosis        Plan:   Renal artery stenosis  Seen by Neph this morning.  Vascular to be arranged.     Has a psychiatry appt tomorrow  Sheyla Vu - [facility].    UTI -   On cipro  Will discuss with Dr. Reese.      No Follow-up on file.

## 2018-07-17 NOTE — LETTER
July 17, 2018      Jack Haney MD  [id number]  [place] [address]           [facility]  [address]  [place] [address]  Phone: [phone]  Fax: [phone]          Patient: Rebeca Woodard   MR Number: [id number]   YOB: 1950   Date of Visit: 7/17/2018       Dear Dr. Jack Haney:    Thank you for referring Rebeca Woodard to me for evaluation. Attached you will find relevant portions of my assessment and plan of care.    If you have questions, please do not hesitate to call me. I look forward to following Rebeca Woodard along with you.    Sincerely,    Nola Reese MD    Enclosure  CC:  No Recipients    If you would like to receive this communication electronically, please contact [email or web address] or [phone] to request more information on Why Not Give Back Link access.    For providers and/or their staff who would like to refer a patient to [facility], please contact us through our one-stop-shop provider referral line, [facility], at [phone].    If you feel you have received this communication in error or would no longer like to receive these types of communications, please e-mail [email or web address]

## 2018-07-17 NOTE — PROGRESS NOTES
Subjective:       Patient ID: Rebeca Woodard is a 68 y.o. White female who presents for new evaluation of Acute Renal Failure; Chronic Kidney Disease; and Hyperkalemia    HPI   Patient is a 68-year-old with history of hypertension difficult to control on multiple medications.  Had a creatinine of 1.0 in the year 2017.  Most recently was seen by Dr. Jack Haney.  Creatinine had gone up 2.3 with hyperkalemia.  Patient was sent to the emergency room for further evaluation.  Repeat labs showed creatinine down to 1.9.  Urgent ultrasound for renal artery stenosis was ordered which was done yesterday.  Patient's renal ultrasound shows severe right-sided renal artery stenosis.  According to the patient and the family she has a history of left-sided stent placement on the left kidney in the past.  There is no evidence of left-sided stent on the ultrasound in the current study.  Patient is also suffering from hyperkalemia with severe peripheral artery disease.  Patient also has pyuria and hematuria with a negative urine culture.    July 2018 patient seen for acute kidney injury hyperkalemia and chronic kidney disease with ischemic nephropathy and lisinopril was stopped vascular surgery consulted for further management.  May need Urology while follow-up      Review of Systems   Constitutional: Negative for activity change, appetite change, chills, diaphoresis, fatigue, fever and unexpected weight change.   HENT: Negative for congestion, dental problem, drooling, postnasal drip, rhinorrhea and voice change.    Eyes: Negative for discharge.   Respiratory: Negative for apnea, cough, choking, chest tightness, shortness of breath, wheezing and stridor.    Cardiovascular: Negative for chest pain, palpitations and leg swelling.   Gastrointestinal: Negative for abdominal distention, blood in stool, constipation, diarrhea, nausea, rectal pain and vomiting.   Endocrine: Negative for cold intolerance, heat intolerance, polydipsia and  "polyuria.   Genitourinary: Negative for decreased urine volume, difficulty urinating, dysuria, enuresis, flank pain, frequency, hematuria and urgency.   Musculoskeletal: Negative for arthralgias, back pain, gait problem and joint swelling.   Skin: Negative for rash.   Allergic/Immunologic: Negative for food allergies and immunocompromised state.   Neurological: Negative for dizziness, tremors, syncope, numbness and headaches.   Hematological: Does not bruise/bleed easily.   Psychiatric/Behavioral: Negative for agitation, behavioral problems and self-injury. The patient is not nervous/anxious and is not hyperactive.    All other systems reviewed and are negative.      Objective:   BP (!) 152/60   Pulse 80   Ht 5' 2" (1.575 m)   Wt 50.6 kg (111 lb 8.8 oz)   BMI 20.40 kg/m²      Physical Exam   Constitutional: She is oriented to person, place, and time. No distress.   HENT:   Head: Normocephalic and atraumatic.   Nose: Nose normal.   Eyes: Conjunctivae and EOM are normal. Pupils are equal, round, and reactive to light.   Neck: Normal range of motion. No JVD present. No tracheal deviation present. No thyromegaly present.   Cardiovascular: Normal rate, regular rhythm and intact distal pulses.  Exam reveals gallop and S4. Exam reveals no friction rub.    No murmur heard.  Pulses:       Carotid pulses are 1+ on the right side.       Radial pulses are 1+ on the right side.        Femoral pulses are 1+ on the right side with bruit, and on the left side with bruit.       Popliteal pulses are 1+ on the right side.   Peripheral arterial disease   Pulmonary/Chest: Effort normal and breath sounds normal. No respiratory distress. She has no wheezes. She has no rales. She exhibits no tenderness.   Abdominal: Soft. Bowel sounds are normal. She exhibits abdominal bruit. She exhibits no distension and no mass. There is no tenderness. No hernia.   Musculoskeletal: Normal range of motion. She exhibits no edema, tenderness or " deformity.   Neurological: She is alert and oriented to person, place, and time. She has normal reflexes. She displays normal reflexes. No cranial nerve deficit. She exhibits normal muscle tone. Coordination normal.   Skin: Skin is warm. She is not diaphoretic. No erythema. There is pallor.   Psychiatric: She has a normal mood and affect. Her behavior is normal. Judgment and thought content normal.   Nursing note and vitals reviewed.        Lab Results   Component Value Date    CREATININE 1.9 (H) 07/13/2018    BUN 34 (H) 07/13/2018     07/13/2018    K 5.3 (H) 07/13/2018     07/13/2018    CO2 20 (L) 07/13/2018     Lab Results   Component Value Date    WBC 7.16 07/13/2018    HGB 12.3 07/13/2018    HCT 37.4 07/13/2018    MCV 85 07/13/2018     07/13/2018     Lab Results   Component Value Date    CALCIUM 10.0 07/13/2018     @RESUFAST(URICACID)    Assessment:    )    1. SOHAIL (acute kidney injury)    2. Chronic kidney disease (CKD) stage G3a/A1, moderately decreased glomerular filtration rate (GFR) between 45-59 mL/min/1.73 square meter and albuminuria creatinine ratio less than 30 mg/g    3. Hyperkalemia    4. Right renal artery stenosis    5. Hematuria syndrome        Plan:         patient has fluctuating creatinine with acute kidney injury on chronic kidney disease.  Ischemic nephropathy is high on the list.  Ultrasound consistent with right renal artery stenosis.  Consult vascular surgery for stent placement.  Patient already has a history of stent placed on the left side and has a vascular surgeon for which she will see consult on her own.  Fluctuating blood pressures will be controlled without ACE-inhibitor.  DC lisinopril today.  Add Cardura 4 mg at bedtime.  Hopefully this will treat hyperkalemia as well    Recheck urine for hematuria and proteinuria may need a urology follow-up as well    Vascular surgery consult follow-up in 4 weeks

## 2018-08-01 ENCOUNTER — TELEPHONE (OUTPATIENT)
Dept: INTERNAL MEDICINE | Facility: CLINIC | Age: 68
End: 2018-08-01

## 2018-08-01 ENCOUNTER — TELEPHONE (OUTPATIENT)
Dept: NEPHROLOGY | Facility: CLINIC | Age: 68
End: 2018-08-01

## 2018-08-01 NOTE — TELEPHONE ENCOUNTER
----- Message from Angy Rosas sent at 8/1/2018  9:49 AM CDT -----  Contact: Patient  Patient has some questions about her kidneys and going thru radiation, please call her back at  [phone]. Thank you

## 2018-08-01 NOTE — TELEPHONE ENCOUNTER
----- Message from Angy Rosas sent at 8/1/2018  9:40 AM CDT -----  Contact: Patient  Patient has a question about her radiation, please call her back at [phone]. Thank you

## 2018-08-02 ENCOUNTER — LAB VISIT (OUTPATIENT)
Dept: LAB | Facility: HOSPITAL | Age: 68
End: 2018-08-02
Attending: INTERNAL MEDICINE
Payer: MEDICARE

## 2018-08-02 DIAGNOSIS — E87.5 HYPERKALEMIA: ICD-10-CM

## 2018-08-02 DIAGNOSIS — N17.9 AKI (ACUTE KIDNEY INJURY): ICD-10-CM

## 2018-08-02 DIAGNOSIS — N18.31 CHRONIC KIDNEY DISEASE (CKD) STAGE G3A/A1, MODERATELY DECREASED GLOMERULAR FILTRATION RATE (GFR) BETWEEN 45-59 ML/MIN/1.73 SQUARE METER AND ALBUMINURIA CREATININE RATIO LESS THAN 30 MG/G: ICD-10-CM

## 2018-08-02 DIAGNOSIS — I70.1 RIGHT RENAL ARTERY STENOSIS: ICD-10-CM

## 2018-08-02 LAB
BACTERIA #/AREA URNS HPF: ABNORMAL /HPF
BILIRUB UR QL STRIP: NEGATIVE
CLARITY UR: ABNORMAL
COLOR UR: YELLOW
CREAT UR-MCNC: 63 MG/DL
GLUCOSE UR QL STRIP: NEGATIVE
HGB UR QL STRIP: ABNORMAL
HYALINE CASTS #/AREA URNS LPF: 0 /LPF
KETONES UR QL STRIP: NEGATIVE
LEUKOCYTE ESTERASE UR QL STRIP: ABNORMAL
MICROSCOPIC COMMENT: ABNORMAL
NITRITE UR QL STRIP: NEGATIVE
PH UR STRIP: 6 [PH] (ref 5–8)
PROT UR QL STRIP: ABNORMAL
PROT UR-MCNC: 67 MG/DL
PROT/CREAT UR: 1.06 MG/G{CREAT}
RBC #/AREA URNS HPF: 30 /HPF (ref 0–4)
SP GR UR STRIP: 1.01 (ref 1–1.03)
SQUAMOUS #/AREA URNS HPF: 20 /HPF
URN SPEC COLLECT METH UR: ABNORMAL
WBC #/AREA URNS HPF: >100 /HPF (ref 0–5)
WBC CLUMPS URNS QL MICRO: ABNORMAL

## 2018-08-02 PROCEDURE — 81000 URINALYSIS NONAUTO W/SCOPE: CPT | Mod: PO

## 2018-08-02 PROCEDURE — 84156 ASSAY OF PROTEIN URINE: CPT

## 2018-08-08 ENCOUNTER — TELEPHONE (OUTPATIENT)
Dept: NEPHROLOGY | Facility: CLINIC | Age: 68
End: 2018-08-08

## 2018-08-08 NOTE — TELEPHONE ENCOUNTER
----- Message from Maryanne Shafer sent at 8/8/2018  8:04 AM CDT -----  Contact: self   Patient will need to cancel appointment and would like a call back to r/s at an earlier date than what is available. Please call back at [phone].      Thanks,  Maryanne Shafer

## 2018-08-09 ENCOUNTER — LAB VISIT (OUTPATIENT)
Dept: LAB | Facility: HOSPITAL | Age: 68
End: 2018-08-09
Attending: INTERNAL MEDICINE
Payer: MEDICARE

## 2018-08-09 ENCOUNTER — OFFICE VISIT (OUTPATIENT)
Dept: NEPHROLOGY | Facility: CLINIC | Age: 68
End: 2018-08-09
Payer: MEDICARE

## 2018-08-09 VITALS
HEIGHT: 62 IN | DIASTOLIC BLOOD PRESSURE: 72 MMHG | WEIGHT: 108.69 LBS | BODY MASS INDEX: 20 KG/M2 | SYSTOLIC BLOOD PRESSURE: 144 MMHG | HEART RATE: 76 BPM

## 2018-08-09 DIAGNOSIS — N18.31 CHRONIC KIDNEY DISEASE (CKD) STAGE G3A/A1, MODERATELY DECREASED GLOMERULAR FILTRATION RATE (GFR) BETWEEN 45-59 ML/MIN/1.73 SQUARE METER AND ALBUMINURIA CREATININE RATIO LESS THAN 30 MG/G: ICD-10-CM

## 2018-08-09 DIAGNOSIS — I70.1 RIGHT RENAL ARTERY STENOSIS: ICD-10-CM

## 2018-08-09 DIAGNOSIS — N39.0 RECURRENT UTI (URINARY TRACT INFECTION): ICD-10-CM

## 2018-08-09 DIAGNOSIS — R31.9 HEMATURIA SYNDROME: ICD-10-CM

## 2018-08-09 DIAGNOSIS — E87.5 HYPERKALEMIA: ICD-10-CM

## 2018-08-09 DIAGNOSIS — N17.9 AKI (ACUTE KIDNEY INJURY): Primary | ICD-10-CM

## 2018-08-09 DIAGNOSIS — N17.9 AKI (ACUTE KIDNEY INJURY): ICD-10-CM

## 2018-08-09 LAB
BACTERIA #/AREA URNS HPF: ABNORMAL /HPF
BILIRUB UR QL STRIP: NEGATIVE
CLARITY UR: ABNORMAL
COLOR UR: YELLOW
CREAT UR-MCNC: 61 MG/DL
GLUCOSE UR QL STRIP: NEGATIVE
HGB UR QL STRIP: ABNORMAL
HYALINE CASTS #/AREA URNS LPF: 0 /LPF
KETONES UR QL STRIP: NEGATIVE
LEUKOCYTE ESTERASE UR QL STRIP: ABNORMAL
MICROSCOPIC COMMENT: ABNORMAL
NITRITE UR QL STRIP: NEGATIVE
PH UR STRIP: 6 [PH] (ref 5–8)
PROT UR QL STRIP: ABNORMAL
PROT UR-MCNC: 136 MG/DL
PROT/CREAT UR: 2.23 MG/G{CREAT}
RBC #/AREA URNS HPF: >100 /HPF (ref 0–4)
SP GR UR STRIP: 1.02 (ref 1–1.03)
SQUAMOUS #/AREA URNS HPF: 20 /HPF
URN SPEC COLLECT METH UR: ABNORMAL
WBC #/AREA URNS HPF: 50 /HPF (ref 0–5)
WBC CLUMPS URNS QL MICRO: ABNORMAL

## 2018-08-09 PROCEDURE — 99213 OFFICE O/P EST LOW 20 MIN: CPT | Mod: PBBFAC,PO | Performed by: INTERNAL MEDICINE

## 2018-08-09 PROCEDURE — 81000 URINALYSIS NONAUTO W/SCOPE: CPT | Mod: PO

## 2018-08-09 PROCEDURE — 84156 ASSAY OF PROTEIN URINE: CPT

## 2018-08-09 PROCEDURE — 99214 OFFICE O/P EST MOD 30 MIN: CPT | Mod: S$PBB,,, | Performed by: INTERNAL MEDICINE

## 2018-08-09 PROCEDURE — 99999 PR PBB SHADOW E&M-EST. PATIENT-LVL III: CPT | Mod: PBBFAC,,, | Performed by: INTERNAL MEDICINE

## 2018-08-09 RX ORDER — HYDROCODONE BITARTRATE AND ACETAMINOPHEN 7.5; 325 MG/15ML; MG/15ML
15 SOLUTION ORAL EVERY 6 HOURS PRN
COMMUNITY
Start: 2018-08-02 | End: 2018-08-12

## 2018-08-09 RX ORDER — CIPROFLOXACIN 500 MG/1
500 TABLET ORAL 2 TIMES DAILY
Qty: 20 TABLET | Refills: 0 | Status: SHIPPED | OUTPATIENT
Start: 2018-08-09 | End: 2018-08-20 | Stop reason: ALTCHOICE

## 2018-08-09 NOTE — PATIENT INSTRUCTIONS
Drink plenty of fluids to make urine look like water     Come back to ER if cannot keep fluid down or with Nausea vomiting or diarrhea or any of the current symptoms get worse

## 2018-08-09 NOTE — PROGRESS NOTES
Subjective:       Patient ID: Rebeca Woodard is a 68 y.o. White female who presents for new evaluation of acute kidney injury; Follow-up; Chronic Kidney Disease; recurrent UTI; and Hematuria    Hematuria   Irritative symptoms do not include frequency or urgency. Pertinent negatives include no chills, dysuria, fever, flank pain, nausea or vomiting.      Patient is a 68-year-old with history of hypertension difficult to control on multiple medications.  Had a creatinine of 1.0 in the year 2017.  Most recently was seen by Dr. Jack Haney.  Creatinine had gone up 2.3 with hyperkalemia.  Patient was sent to the emergency room for further evaluation.  Repeat labs showed creatinine down to 1.9.  Urgent ultrasound for renal artery stenosis was ordered which was done yesterday.  Patient's renal ultrasound shows severe right-sided renal artery stenosis.  According to the patient and the family she has a history of left-sided stent placement on the left kidney in the past.  There is no evidence of left-sided stent on the ultrasound in the current study.  Patient is also suffering from hyperkalemia with severe peripheral artery disease.  Patient also has pyuria and hematuria with a negative urine culture.    July 2018 patient seen for acute kidney injury hyperkalemia and chronic kidney disease with ischemic nephropathy and lisinopril was stopped vascular surgery consulted for further management.  May need Urology while follow-up      8/2018 throat cancer pending XRT dr. Vu ; FUENTES ; cipro SOHAIL better ; PET 8/3/2018 1.  Hypermetabolic lesion in the oropharynx consistent with malignancy. Regional metastatic disease to cervical lymph nodes. 2.  Pleuroparenchymal nodule at the left lung apex without associated hypermetabolic activity.3. Mild esophageal uptake likely in keeping with esophagitis or reflux disease.4. Mild increase in size of cystic left adnexal mass.5. Chronic left obstructive uropathy with ureteral stent in place.  "Proximal end of the stent is low-lying. Correlate for desired position       Review of Systems   Constitutional: Negative for activity change, appetite change, chills, diaphoresis, fatigue, fever and unexpected weight change.   HENT: Negative for congestion, dental problem, drooling, postnasal drip, rhinorrhea and voice change.    Eyes: Negative for discharge.   Respiratory: Negative for apnea, cough, choking, chest tightness, shortness of breath, wheezing and stridor.    Cardiovascular: Negative for chest pain, palpitations and leg swelling.   Gastrointestinal: Negative for abdominal distention, blood in stool, constipation, diarrhea, nausea, rectal pain and vomiting.   Endocrine: Negative for cold intolerance, heat intolerance, polydipsia and polyuria.   Genitourinary: Positive for hematuria. Negative for decreased urine volume, difficulty urinating, dysuria, enuresis, flank pain, frequency and urgency.   Musculoskeletal: Negative for arthralgias, back pain, gait problem and joint swelling.   Skin: Negative for rash.   Allergic/Immunologic: Negative for food allergies and immunocompromised state.   Neurological: Negative for dizziness, tremors, syncope, numbness and headaches.   Hematological: Does not bruise/bleed easily.   Psychiatric/Behavioral: Negative for agitation, behavioral problems and self-injury. The patient is not nervous/anxious and is not hyperactive.    All other systems reviewed and are negative.      Objective:   BP (!) 144/72   Pulse 76   Ht 5' 2" (1.575 m)   Wt 49.3 kg (108 lb 11 oz)   BMI 19.88 kg/m²      Physical Exam   Constitutional: She is oriented to person, place, and time. No distress.   HENT:   Head: Normocephalic and atraumatic.   Nose: Nose normal.   Eyes: Conjunctivae and EOM are normal. Pupils are equal, round, and reactive to light.   Neck: Normal range of motion. No JVD present. No tracheal deviation present. No thyromegaly present.   Cardiovascular: Normal rate, regular " rhythm and intact distal pulses.  Exam reveals gallop and S4. Exam reveals no friction rub.    No murmur heard.  Pulses:       Carotid pulses are 1+ on the right side.       Radial pulses are 1+ on the right side.        Femoral pulses are 1+ on the right side with bruit, and on the left side with bruit.       Popliteal pulses are 1+ on the right side.   Peripheral arterial disease   Pulmonary/Chest: Effort normal and breath sounds normal. No respiratory distress. She has no wheezes. She has no rales. She exhibits no tenderness.   Abdominal: Soft. Bowel sounds are normal. She exhibits abdominal bruit. She exhibits no distension and no mass. There is no tenderness. No hernia.   Musculoskeletal: Normal range of motion. She exhibits no edema, tenderness or deformity.   Neurological: She is alert and oriented to person, place, and time. She has normal reflexes. She displays normal reflexes. No cranial nerve deficit. She exhibits normal muscle tone. Coordination normal.   Skin: Skin is warm. She is not diaphoretic. No erythema. There is pallor.   Psychiatric: She has a normal mood and affect. Her behavior is normal. Judgment and thought content normal.   Nursing note and vitals reviewed.        Lab Results   Component Value Date    CREATININE 1.7 (H) 08/02/2018    BUN 26 (H) 08/02/2018     08/02/2018    K 5.2 (H) 08/02/2018     08/02/2018    CO2 22 (L) 08/02/2018     Lab Results   Component Value Date    WBC 7.47 08/02/2018    HGB 10.4 (L) 08/02/2018    HCT 33.9 (L) 08/02/2018    MCV 89 08/02/2018     08/02/2018     Lab Results   Component Value Date    CALCIUM 9.7 08/02/2018    PHOS 3.9 08/02/2018     @RESUFAST(URICACID)    Assessment:    )    1. SOHAIL (acute kidney injury)    2. Chronic kidney disease (CKD) stage G3a/A1, moderately decreased glomerular filtration rate (GFR) between 45-59 mL/min/1.73 square meter and albuminuria creatinine ratio less than 30 mg/g    3. Hyperkalemia    4. Right renal  artery stenosis    5. Hematuria syndrome    6. Recurrent UTI (urinary tract infection)        Plan:         1.  Acute kidney injury on chronic kidney disease stage 3: patient has fluctuating creatinine with acute kidney injury on chronic kidney disease.  Ischemic nephropathy is high on the list.  Ultrasound consistent with right renal artery stenosis.   Patient already has a history of stent placed on the left side and has a vascular surgeon for which she will see consult on her own.  Fluctuating blood pressures will be controlled without ACE-inhibitor.  Do not use ACE-inhibitor.  Blood pressure is well controlled.  Patient is undergoing evaluation for head and neck cancer with history of throat cancer PET scan reviewed.  Patient has pending radiation therapy.  Patient has been instructed to drink plenty of fluids at this time.  I am not consulting vascular surgeon at this time and hold off on any intervention in the kidney.  Acute kidney injury has improved and stabilized off ACE-inhibitor.    2.  Recurrent hematuria and pyuria:  Check urine culture today.  Check procalcitonin today.  Start Cipro today.  Consult Urology    3.  Hyperkalemia:  Off ACE-inhibitor.  Follow closely no intervention at this time.      Follow-up 2 months

## 2018-08-10 ENCOUNTER — PATIENT OUTREACH (OUTPATIENT)
Dept: ADMINISTRATIVE | Facility: HOSPITAL | Age: 68
End: 2018-08-10

## 2018-08-20 ENCOUNTER — OFFICE VISIT (OUTPATIENT)
Dept: INTERNAL MEDICINE | Facility: CLINIC | Age: 68
End: 2018-08-20
Payer: MEDICARE

## 2018-08-20 VITALS
WEIGHT: 108.25 LBS | OXYGEN SATURATION: 97 % | HEIGHT: 62 IN | HEART RATE: 84 BPM | DIASTOLIC BLOOD PRESSURE: 65 MMHG | BODY MASS INDEX: 19.92 KG/M2 | SYSTOLIC BLOOD PRESSURE: 135 MMHG | TEMPERATURE: 99 F | RESPIRATION RATE: 20 BRPM

## 2018-08-20 DIAGNOSIS — N18.30 STAGE 3 CHRONIC KIDNEY DISEASE: ICD-10-CM

## 2018-08-20 DIAGNOSIS — N39.0 RECURRENT UTI: ICD-10-CM

## 2018-08-20 DIAGNOSIS — I10 ESSENTIAL HYPERTENSION: ICD-10-CM

## 2018-08-20 DIAGNOSIS — I70.1 RENAL ARTERY STENOSIS: Primary | ICD-10-CM

## 2018-08-20 DIAGNOSIS — C10.9 OROPHARYNGEAL CANCER: ICD-10-CM

## 2018-08-20 DIAGNOSIS — Z12.11 SCREENING FOR COLON CANCER: ICD-10-CM

## 2018-08-20 DIAGNOSIS — N17.9 AKI (ACUTE KIDNEY INJURY): ICD-10-CM

## 2018-08-20 DIAGNOSIS — N39.0 URINARY TRACT INFECTION WITHOUT HEMATURIA, SITE UNSPECIFIED: Primary | ICD-10-CM

## 2018-08-20 DIAGNOSIS — Z12.39 SCREENING FOR BREAST CANCER: ICD-10-CM

## 2018-08-20 LAB
BACTERIA #/AREA URNS HPF: ABNORMAL /HPF
BILIRUB UR QL STRIP: NEGATIVE
CLARITY UR: ABNORMAL
COLOR UR: YELLOW
GLUCOSE UR QL STRIP: NEGATIVE
HGB UR QL STRIP: ABNORMAL
HYALINE CASTS #/AREA URNS LPF: ABNORMAL /LPF
KETONES UR QL STRIP: NEGATIVE
LEUKOCYTE ESTERASE UR QL STRIP: ABNORMAL
MICROSCOPIC COMMENT: ABNORMAL
NITRITE UR QL STRIP: NEGATIVE
NON-SQ EPI CELLS #/AREA URNS HPF: 1 /HPF
PH UR STRIP: 6 [PH] (ref 5–8)
PROT UR QL STRIP: ABNORMAL
RBC #/AREA URNS HPF: >100 /HPF (ref 0–4)
SP GR UR STRIP: 1.02 (ref 1–1.03)
SQUAMOUS #/AREA URNS HPF: 2 /HPF
URN SPEC COLLECT METH UR: ABNORMAL
WBC #/AREA URNS HPF: >100 /HPF (ref 0–5)
YEAST URNS QL MICRO: ABNORMAL

## 2018-08-20 PROCEDURE — 99999 PR PBB SHADOW E&M-EST. PATIENT-LVL IV: CPT | Mod: PBBFAC,,, | Performed by: FAMILY MEDICINE

## 2018-08-20 PROCEDURE — 99214 OFFICE O/P EST MOD 30 MIN: CPT | Mod: PBBFAC | Performed by: FAMILY MEDICINE

## 2018-08-20 PROCEDURE — 99214 OFFICE O/P EST MOD 30 MIN: CPT | Mod: S$PBB,,, | Performed by: FAMILY MEDICINE

## 2018-08-20 PROCEDURE — 81000 URINALYSIS NONAUTO W/SCOPE: CPT

## 2018-08-20 RX ORDER — AMOXICILLIN AND CLAVULANATE POTASSIUM 500; 125 MG/1; MG/1
1 TABLET, FILM COATED ORAL 2 TIMES DAILY
Qty: 12 TABLET | Refills: 0 | Status: SHIPPED | OUTPATIENT
Start: 2018-08-20 | End: 2018-08-27

## 2018-08-20 NOTE — PROGRESS NOTES
Subjective:       Patient ID: Rebeca Woodard is a 68 y.o. female.    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 Renal Artery Stenosis  Recurrent UTI  Anemia    Plan for Chemo and Rad  Port to be placed on Wednesday     Recently saw Nephrology  Found to have hematuria and pyuria -   Started on Cipro -she finishes that today or tomorrow.    No dysuria. No cloudy.  No problems urinating.     Has history of Hyperkalemia.  Had labs drawn 4 days ago.    Renal function improved - Cr 1.33 and GFR 40.  Stopped Lisinopril.    Blood Count low but stable.   Review of Systems   Constitutional: Negative for chills and fever.   HENT: Negative for congestion, sinus pressure and voice change.    Eyes: Negative for pain and visual disturbance.   Respiratory: Negative for shortness of breath and wheezing.    Cardiovascular: Negative for chest pain and palpitations.   Gastrointestinal: Negative.  Negative for constipation and diarrhea.        Some ongoing throat pain   Genitourinary: Negative for difficulty urinating and dysuria.   Musculoskeletal: Negative for gait problem and myalgias.   Skin: Negative for pallor and rash.   Neurological: Negative for dizziness and light-headedness.   Psychiatric/Behavioral: Negative for confusion and dysphoric mood.       Objective:       Vitals:    08/20/18 1035   BP: 135/65   Pulse:    Resp:    Temp:      HR 84  02 97  RR 20   Physical Exam   Constitutional: She is oriented to person, place, and time. She appears well-developed and well-nourished. She does not have a sickly appearance. No distress.   HENT:   Head: Normocephalic and atraumatic.   Right Ear: External ear normal.   Left Ear: External ear normal.   Eyes: Conjunctivae, EOM and lids are normal.   Neck: Trachea normal, normal range of motion and full passive range of motion without pain.   Cardiovascular: Normal rate,  regular rhythm and normal heart sounds.   Pulmonary/Chest: Effort normal and breath sounds normal. No stridor. No respiratory distress.   Abdominal: Soft. Normal appearance and bowel sounds are normal. She exhibits no distension. There is no tenderness. There is no guarding. No hernia.   Musculoskeletal: Normal range of motion.   Lymphadenopathy:     She has no cervical adenopathy.   Neurological: She is alert and oriented to person, place, and time. She is not disoriented.   Skin: Skin is warm, dry and intact. No rash noted. She is not diaphoretic.   Psychiatric: She has a normal mood and affect. Her speech is normal and behavior is normal. Thought content normal.       Assessment:       1. Renal artery stenosis    2. SOHAIL (acute kidney injury)    3. Stage 3 chronic kidney disease    4. Essential hypertension    5. Oropharyngeal cancer    6. Recurrent UTI    7. Screening for breast cancer    8. Screening for colon cancer        Plan:   Renal artery stenosis  SOHAIL (acute kidney injury)  Stage 3 chronic kidney disease    Renal function improved   Off Lisinopril due to CHRIS.  Will continue to monitor.  Holding on labs as just had blood test at [facility]  GFR and Cr improved.     Essential hypertension    Well controlled      Oropharyngeal cancer    Port to be placed Wed  Planned Chemo and Rad    Recurrent UTI  Finishing recent abx tomorrow.  I would like a UA/ micro/ culture to evaluate    Screening for colon cancer  Declines Colonoscopy  FitKit to be done    Screening for Breast Cancer  Mammo to be done.       3 mo f/u    No Follow-up on file.

## 2018-08-21 ENCOUNTER — TELEPHONE (OUTPATIENT)
Dept: INTERNAL MEDICINE | Facility: CLINIC | Age: 68
End: 2018-08-21

## 2018-08-21 NOTE — TELEPHONE ENCOUNTER
----- Message from Jack Haney MD sent at 8/20/2018  5:46 PM CDT -----  Noted cipro did not help UTI.  Uncertain if bacterial cause  Do not want to miss infection  Awaiting culture  Avoiding macrobid and bactrim given renal status  Called in augmentin  Will adjust accordingly.   I want repeat urine in 1 week.  Please call and arrange  I discussed findings with patient.

## 2018-08-27 ENCOUNTER — OFFICE VISIT (OUTPATIENT)
Dept: INTERNAL MEDICINE | Facility: CLINIC | Age: 68
End: 2018-08-27
Payer: MEDICARE

## 2018-08-27 VITALS
DIASTOLIC BLOOD PRESSURE: 62 MMHG | RESPIRATION RATE: 20 BRPM | TEMPERATURE: 99 F | BODY MASS INDEX: 20.35 KG/M2 | HEART RATE: 72 BPM | OXYGEN SATURATION: 98 % | WEIGHT: 107.81 LBS | HEIGHT: 61 IN | SYSTOLIC BLOOD PRESSURE: 148 MMHG

## 2018-08-27 DIAGNOSIS — I10 HYPERTENSION, UNSPECIFIED TYPE: ICD-10-CM

## 2018-08-27 DIAGNOSIS — N39.0 URINARY TRACT INFECTION WITHOUT HEMATURIA, SITE UNSPECIFIED: Primary | ICD-10-CM

## 2018-08-27 LAB
BACTERIA #/AREA URNS HPF: ABNORMAL /HPF
MICROSCOPIC COMMENT: ABNORMAL
RBC #/AREA URNS HPF: >100 /HPF (ref 0–4)
SQUAMOUS #/AREA URNS HPF: 2 /HPF
WBC #/AREA URNS HPF: >100 /HPF (ref 0–5)

## 2018-08-27 PROCEDURE — 81000 URINALYSIS NONAUTO W/SCOPE: CPT

## 2018-08-27 PROCEDURE — 99214 OFFICE O/P EST MOD 30 MIN: CPT | Mod: S$PBB,,, | Performed by: FAMILY MEDICINE

## 2018-08-27 PROCEDURE — 87088 URINE BACTERIA CULTURE: CPT

## 2018-08-27 PROCEDURE — 99214 OFFICE O/P EST MOD 30 MIN: CPT | Mod: PBBFAC | Performed by: FAMILY MEDICINE

## 2018-08-27 PROCEDURE — 99999 PR PBB SHADOW E&M-EST. PATIENT-LVL IV: CPT | Mod: PBBFAC,,, | Performed by: FAMILY MEDICINE

## 2018-08-27 PROCEDURE — 87106 FUNGI IDENTIFICATION YEAST: CPT

## 2018-08-27 PROCEDURE — 87086 URINE CULTURE/COLONY COUNT: CPT

## 2018-08-27 RX ORDER — CARVEDILOL 12.5 MG/1
12.5 TABLET ORAL 2 TIMES DAILY WITH MEALS
Qty: 60 TABLET | Refills: 3 | Status: SHIPPED | OUTPATIENT
Start: 2018-08-27 | End: 2018-09-04 | Stop reason: DRUGHIGH

## 2018-08-27 RX ORDER — HYDROCODONE BITARTRATE AND ACETAMINOPHEN 7.5; 325 MG/15ML; MG/15ML
SOLUTION ORAL
Refills: 0 | COMMUNITY
Start: 2018-08-21 | End: 2021-06-20 | Stop reason: CLARIF

## 2018-08-27 NOTE — PROGRESS NOTES
Subjective:       Patient ID: Rebeca Woodard is a 68 y.o. female.    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 Renal Artery Stenosis  Recurrent UTI  Anemia    At last visit she was on antibiotics for UTI  I repeated labs  Found still had infectious appearing UA.    Called in Augmentin    Patient finished course  Feeling improved.     I see 2 Cultures ordered but no results.      Having no urinary symptoms  It was cloudy prior to treatment - but seems improved    Has hydrated well.  Has better energy  Not feeling drained.         Review of Systems   Constitutional: Negative for chills and fever.   HENT: Negative for congestion, sinus pressure and voice change.    Eyes: Negative for pain and visual disturbance.   Respiratory: Negative for shortness of breath and wheezing.    Cardiovascular: Negative for chest pain and palpitations.   Gastrointestinal: Negative for constipation and diarrhea.   Genitourinary: Negative for difficulty urinating and dysuria.   Musculoskeletal: Negative for gait problem and myalgias.   Skin: Negative for pallor and rash.   Neurological: Negative for dizziness and light-headedness.   Psychiatric/Behavioral: Negative for confusion and dysphoric mood.       Objective:       Vitals:    08/27/18 1156   BP: (!) 148/62   Pulse: 72   Resp: 20   Temp: 98.7 °F (37.1 °C)       Physical Exam   Constitutional: She is oriented to person, place, and time. She appears well-developed and well-nourished. She does not have a sickly appearance. No distress.   HENT:   Head: Normocephalic and atraumatic.   Right Ear: External ear normal.   Left Ear: External ear normal.   Eyes: Conjunctivae, EOM and lids are normal.   Neck: Trachea normal, normal range of motion and full passive range of motion without pain.   Cardiovascular: Normal rate, regular rhythm and normal heart sounds.   Pulmonary/Chest: Effort  normal and breath sounds normal. No stridor. No respiratory distress.   Abdominal: Soft. Normal appearance and bowel sounds are normal. She exhibits no distension. There is no tenderness. There is no guarding. No hernia.   Musculoskeletal: Normal range of motion.   Lymphadenopathy:     She has no cervical adenopathy.   Neurological: She is alert and oriented to person, place, and time. She is not disoriented.   Skin: Skin is warm, dry and intact. No rash noted. She is not diaphoretic.   Psychiatric: She has a normal mood and affect. Her speech is normal and behavior is normal. Thought content normal.       Assessment:       1. Urinary tract infection without hematuria, site unspecified    2. Hypertension, unspecified type        Plan:   Urinary tract infection without hematuria, site unspecified  -     Urinalysis Microscopic  -     Urine culture; Future; Expected date: 08/27/2018  -     Urinalysis; Future; Expected date: 08/27/2018    Checking urine studies to ensure UTI resolved  Feeling much improved  I do not want to hold up any port placement for throat cancer.     Hypertension, unspecified type    Increasing dose to 12.5  F/u in 1 month for review    -     carvedilol (COREG) 12.5 MG tablet; Take 1 tablet (12.5 mg total) by mouth 2 (two) times daily with meals.  Dispense: 60 tablet; Refill: 3        No Follow-up on file.

## 2018-08-28 ENCOUNTER — TELEPHONE (OUTPATIENT)
Dept: INTERNAL MEDICINE | Facility: CLINIC | Age: 68
End: 2018-08-28

## 2018-08-28 RX ORDER — SULFAMETHOXAZOLE AND TRIMETHOPRIM 400; 80 MG/1; MG/1
1 TABLET ORAL 2 TIMES DAILY
Qty: 12 TABLET | Refills: 0 | Status: SHIPPED | OUTPATIENT
Start: 2018-08-28 | End: 2018-09-03

## 2018-08-28 NOTE — TELEPHONE ENCOUNTER
----- Message from Juanis Frias sent at 8/28/2018  8:29 AM CDT -----  Contact: Sarkis De La Rosa's Office/[facility]  She's calling stating that the pt is due to have surgery today at 12 noon and they have to make sure that her UTI has been cleared, please advise [phone]

## 2018-08-30 LAB — BACTERIA UR CULT: NORMAL

## 2018-09-04 ENCOUNTER — PATIENT OUTREACH (OUTPATIENT)
Dept: ADMINISTRATIVE | Facility: HOSPITAL | Age: 68
End: 2018-09-04

## 2018-09-04 ENCOUNTER — OFFICE VISIT (OUTPATIENT)
Dept: INTERNAL MEDICINE | Facility: CLINIC | Age: 68
End: 2018-09-04
Payer: MEDICARE

## 2018-09-04 ENCOUNTER — LAB VISIT (OUTPATIENT)
Dept: LAB | Facility: HOSPITAL | Age: 68
End: 2018-09-04
Attending: FAMILY MEDICINE
Payer: MEDICARE

## 2018-09-04 VITALS
WEIGHT: 104.5 LBS | HEART RATE: 74 BPM | RESPIRATION RATE: 18 BRPM | BODY MASS INDEX: 19.58 KG/M2 | DIASTOLIC BLOOD PRESSURE: 58 MMHG | TEMPERATURE: 96 F | SYSTOLIC BLOOD PRESSURE: 146 MMHG | OXYGEN SATURATION: 98 %

## 2018-09-04 DIAGNOSIS — R82.90 ABNORMAL FINDING IN URINE: ICD-10-CM

## 2018-09-04 DIAGNOSIS — B37.49 CANDIDA UTI: ICD-10-CM

## 2018-09-04 DIAGNOSIS — B37.49 CANDIDA UTI: Primary | ICD-10-CM

## 2018-09-04 DIAGNOSIS — C10.9 OROPHARYNGEAL CANCER: ICD-10-CM

## 2018-09-04 LAB
ALBUMIN SERPL BCP-MCNC: 3.8 G/DL
ALP SERPL-CCNC: 64 U/L
ALT SERPL W/O P-5'-P-CCNC: 9 U/L
ANION GAP SERPL CALC-SCNC: 8 MMOL/L
AST SERPL-CCNC: 19 U/L
BACTERIA #/AREA URNS HPF: ABNORMAL /HPF
BASOPHILS # BLD AUTO: 0.04 K/UL
BASOPHILS NFR BLD: 0.5 %
BILIRUB SERPL-MCNC: 0.3 MG/DL
BILIRUB UR QL STRIP: NEGATIVE
BUN SERPL-MCNC: 19 MG/DL
CALCIUM SERPL-MCNC: 10.3 MG/DL
CHLORIDE SERPL-SCNC: 105 MMOL/L
CLARITY UR: ABNORMAL
CO2 SERPL-SCNC: 26 MMOL/L
COLOR UR: YELLOW
CREAT SERPL-MCNC: 1.7 MG/DL
DIFFERENTIAL METHOD: ABNORMAL
EOSINOPHIL # BLD AUTO: 0.2 K/UL
EOSINOPHIL NFR BLD: 3 %
ERYTHROCYTE [DISTWIDTH] IN BLOOD BY AUTOMATED COUNT: 13.4 %
EST. GFR  (AFRICAN AMERICAN): 35.7 ML/MIN/1.73 M^2
EST. GFR  (NON AFRICAN AMERICAN): 31 ML/MIN/1.73 M^2
GLUCOSE SERPL-MCNC: 114 MG/DL
GLUCOSE UR QL STRIP: NEGATIVE
HCT VFR BLD AUTO: 34.4 %
HGB BLD-MCNC: 10.8 G/DL
HGB UR QL STRIP: ABNORMAL
HYALINE CASTS #/AREA URNS LPF: ABNORMAL /LPF
IMM GRANULOCYTES # BLD AUTO: 0.02 K/UL
IMM GRANULOCYTES NFR BLD AUTO: 0.3 %
KETONES UR QL STRIP: NEGATIVE
LEUKOCYTE ESTERASE UR QL STRIP: ABNORMAL
LYMPHOCYTES # BLD AUTO: 1.3 K/UL
LYMPHOCYTES NFR BLD: 16.5 %
MCH RBC QN AUTO: 27.6 PG
MCHC RBC AUTO-ENTMCNC: 31.4 G/DL
MCV RBC AUTO: 88 FL
MICROSCOPIC COMMENT: ABNORMAL
MONOCYTES # BLD AUTO: 0.5 K/UL
MONOCYTES NFR BLD: 6.3 %
NEUTROPHILS # BLD AUTO: 5.6 K/UL
NEUTROPHILS NFR BLD: 73.4 %
NITRITE UR QL STRIP: NEGATIVE
NRBC BLD-RTO: 0 /100 WBC
PH UR STRIP: 6 [PH] (ref 5–8)
PLATELET # BLD AUTO: 214 K/UL
PMV BLD AUTO: 11.7 FL
POTASSIUM SERPL-SCNC: 4.6 MMOL/L
PROCALCITONIN SERPL IA-MCNC: 0.05 NG/ML
PROT SERPL-MCNC: 7.9 G/DL
PROT UR QL STRIP: ABNORMAL
RBC # BLD AUTO: 3.91 M/UL
RBC #/AREA URNS HPF: >100 /HPF (ref 0–4)
SODIUM SERPL-SCNC: 139 MMOL/L
SP GR UR STRIP: 1.02 (ref 1–1.03)
SQUAMOUS #/AREA URNS HPF: 5 /HPF
URN SPEC COLLECT METH UR: ABNORMAL
WBC # BLD AUTO: 7.65 K/UL
WBC #/AREA URNS HPF: >100 /HPF (ref 0–5)
WBC CLUMPS URNS QL MICRO: ABNORMAL
YEAST URNS QL MICRO: ABNORMAL

## 2018-09-04 PROCEDURE — 85025 COMPLETE CBC W/AUTO DIFF WBC: CPT

## 2018-09-04 PROCEDURE — 99214 OFFICE O/P EST MOD 30 MIN: CPT | Mod: S$PBB,,, | Performed by: FAMILY MEDICINE

## 2018-09-04 PROCEDURE — 80053 COMPREHEN METABOLIC PANEL: CPT

## 2018-09-04 PROCEDURE — 87086 URINE CULTURE/COLONY COUNT: CPT

## 2018-09-04 PROCEDURE — 99999 PR PBB SHADOW E&M-EST. PATIENT-LVL IV: CPT | Mod: PBBFAC,,, | Performed by: FAMILY MEDICINE

## 2018-09-04 PROCEDURE — 36415 COLL VENOUS BLD VENIPUNCTURE: CPT

## 2018-09-04 PROCEDURE — 99214 OFFICE O/P EST MOD 30 MIN: CPT | Mod: PBBFAC | Performed by: FAMILY MEDICINE

## 2018-09-04 PROCEDURE — 84145 PROCALCITONIN (PCT): CPT

## 2018-09-04 PROCEDURE — 81000 URINALYSIS NONAUTO W/SCOPE: CPT

## 2018-09-04 RX ORDER — CARVEDILOL 25 MG/1
25 TABLET ORAL 2 TIMES DAILY WITH MEALS
Qty: 60 TABLET | Refills: 11 | Status: SHIPPED | OUTPATIENT
Start: 2018-09-04 | End: 2019-01-10

## 2018-09-04 NOTE — PROGRESS NOTES
Subjective:       Patient ID: Rebeca Woodard is a 68 y.o. female.    Chief Complaint: Follow-up    HPI     67 yo F    UTI issue  Has been treated with 3 course of abx  Culture grew back last week  indicated Candida Parpilosis      Cancer treatment is being delayed out of concern of UTI.    No urinary symptoms  Normal urination    No fevers  No Chills  No urgency  No pains  No burning    Normal PO intake  Appetite is poor.       Review of Systems   Constitutional: Negative for chills and fever.   HENT: Negative for congestion, sinus pressure and voice change.    Eyes: Negative for pain and visual disturbance.   Respiratory: Negative for shortness of breath and wheezing.    Cardiovascular: Negative for chest pain and palpitations.   Gastrointestinal: Negative for constipation and diarrhea.   Genitourinary: Negative for difficulty urinating and dysuria.   Musculoskeletal: Negative for gait problem and myalgias.   Skin: Negative for pallor and rash.   Neurological: Negative for dizziness and light-headedness.   Psychiatric/Behavioral: Negative for confusion and dysphoric mood.       Objective:       Vitals:    09/04/18 1031   BP: (!) 146/58   Pulse: 74   Resp: 18   Temp: 96.4 °F (35.8 °C)       Physical Exam   Constitutional: She is oriented to person, place, and time. She appears well-developed and well-nourished. She does not have a sickly appearance. No distress.   HENT:   Head: Normocephalic and atraumatic.   Right Ear: External ear normal.   Left Ear: External ear normal.   Eyes: Conjunctivae, EOM and lids are normal.   Neck: Trachea normal, normal range of motion and full passive range of motion without pain.   Cardiovascular: Normal rate, regular rhythm and normal heart sounds.   Pulmonary/Chest: Effort normal and breath sounds normal. No stridor. No respiratory distress.   Abdominal: Soft. Normal appearance and bowel sounds are normal. She exhibits no distension. There is no tenderness. There is no guarding.  No hernia.   Musculoskeletal: Normal range of motion.   Lymphadenopathy:     She has no cervical adenopathy.   Neurological: She is alert and oriented to person, place, and time. She is not disoriented.   Skin: Skin is warm, dry and intact. No rash noted. She is not diaphoretic.   Psychiatric: She has a normal mood and affect. Her speech is normal and behavior is normal. Thought content normal.       Assessment:       1. Candida UTI    2. Abnormal finding in urine     3. Oropharyngeal cancer        Plan:   Candida UTI  -     Urinalysis Microscopic  -     Urinalysis; Future; Expected date: 09/04/2018  -     Urine culture; Future; Expected date: 09/04/2018    Finished last antibiotic yesterday mrtiffany.     uncertain if this candida in urien require treatment of if this should delay cancer treatment - port placement.   CBC  CMP  Procalcitonin    To discuss with Neph and uro    HTN  Not controlled  Increased Coreg dose  Also using Amlodipine and Doxazosin.          No Follow-up on file.

## 2018-09-05 ENCOUNTER — TELEPHONE (OUTPATIENT)
Dept: INTERNAL MEDICINE | Facility: CLINIC | Age: 68
End: 2018-09-05

## 2018-09-05 ENCOUNTER — CLINICAL SUPPORT (OUTPATIENT)
Dept: INTERNAL MEDICINE | Facility: CLINIC | Age: 68
End: 2018-09-05
Payer: MEDICARE

## 2018-09-05 DIAGNOSIS — N39.0 URINARY TRACT INFECTION WITHOUT HEMATURIA, SITE UNSPECIFIED: Primary | ICD-10-CM

## 2018-09-05 DIAGNOSIS — R82.90 ABNORMAL URINE FINDINGS: Primary | ICD-10-CM

## 2018-09-05 DIAGNOSIS — N39.0 URINARY TRACT INFECTION WITHOUT HEMATURIA, SITE UNSPECIFIED: ICD-10-CM

## 2018-09-05 LAB
AMORPH CRY URNS QL MICRO: ABNORMAL
BACTERIA #/AREA URNS HPF: ABNORMAL /HPF
BILIRUB UR QL STRIP: NEGATIVE
CLARITY UR: ABNORMAL
COLOR UR: YELLOW
GLUCOSE UR QL STRIP: NEGATIVE
HGB UR QL STRIP: ABNORMAL
HYALINE CASTS #/AREA URNS LPF: 5 /LPF
KETONES UR QL STRIP: NEGATIVE
LEUKOCYTE ESTERASE UR QL STRIP: ABNORMAL
MICROSCOPIC COMMENT: ABNORMAL
NITRITE UR QL STRIP: NEGATIVE
NON-SQ EPI CELLS #/AREA URNS HPF: 1 /HPF
PH UR STRIP: 6 [PH] (ref 5–8)
PROT UR QL STRIP: ABNORMAL
RBC #/AREA URNS HPF: >100 /HPF (ref 0–4)
SP GR UR STRIP: 1.03 (ref 1–1.03)
SQUAMOUS #/AREA URNS HPF: 2 /HPF
URN SPEC COLLECT METH UR: ABNORMAL
WBC #/AREA URNS HPF: >100 /HPF (ref 0–5)
WBC CLUMPS URNS QL MICRO: ABNORMAL
YEAST URNS QL MICRO: ABNORMAL

## 2018-09-05 PROCEDURE — 99211 OFF/OP EST MAY X REQ PHY/QHP: CPT | Mod: PBBFAC

## 2018-09-05 PROCEDURE — 99999 PR PBB SHADOW E&M-EST. PATIENT-LVL I: CPT | Mod: PBBFAC,,,

## 2018-09-05 PROCEDURE — 87088 URINE BACTERIA CULTURE: CPT

## 2018-09-05 PROCEDURE — 87106 FUNGI IDENTIFICATION YEAST: CPT

## 2018-09-05 PROCEDURE — 81000 URINALYSIS NONAUTO W/SCOPE: CPT

## 2018-09-05 PROCEDURE — 87086 URINE CULTURE/COLONY COUNT: CPT

## 2018-09-06 ENCOUNTER — TELEPHONE (OUTPATIENT)
Dept: INTERNAL MEDICINE | Facility: CLINIC | Age: 68
End: 2018-09-06

## 2018-09-06 ENCOUNTER — APPOINTMENT (OUTPATIENT)
Dept: LAB | Facility: HOSPITAL | Age: 68
End: 2018-09-06
Attending: FAMILY MEDICINE
Payer: MEDICARE

## 2018-09-06 ENCOUNTER — HOSPITAL ENCOUNTER (OUTPATIENT)
Dept: RADIOLOGY | Facility: HOSPITAL | Age: 68
Discharge: HOME | End: 2018-09-06
Attending: FAMILY MEDICINE
Payer: MEDICARE

## 2018-09-06 DIAGNOSIS — R31.29 OTHER MICROSCOPIC HEMATURIA: ICD-10-CM

## 2018-09-06 LAB
BACTERIA UR CULT: NORMAL
BACTERIA UR CULT: NORMAL

## 2018-09-06 PROCEDURE — 74176 CT ABD & PELVIS W/O CONTRAST: CPT | Mod: TC

## 2018-09-06 NOTE — TELEPHONE ENCOUNTER
----- Message from Jack Haney MD sent at 9/6/2018  1:11 PM CDT -----  Call and arrange CT I ordered.

## 2018-09-06 NOTE — PROGRESS NOTES
Subjective:       Patient ID: Rebeca Woodard is a 68 y.o. female.    Chief Complaint: No chief complaint on file.    HPI  Review of Systems    Objective:      Physical Exam    Assessment:       1. Other microscopic hematuria        Plan:   Other microscopic hematuria  -     CT Renal Stone Study ABD Pelvis WO; Future; Expected date: 09/06/2018        No Follow-up on file.

## 2018-09-06 NOTE — TELEPHONE ENCOUNTER
Called and discussed.    I called Dr. De La Rosa - Gen Surgery   Port placement held up 2'2 to suspect UTI.    I do not feel this is UTI. Entirely asymptomatic, negative pro-calcitonin, negative WBC on CBC.     Upon further chart review noted Stent in Ureter. Patient reports placed at [facility] - years ago.  She is uncertain of follow up (or lack there off - states was not informed of need for follow -up.     Will obtain CT renal stone protocol for stent evaluation and patient has upcoming apt with urology.     A culture is still pending from Catheterized urine. If that indicates bacterial infection will treat according to sensitivities.

## 2018-09-07 ENCOUNTER — TELEPHONE (OUTPATIENT)
Dept: INTERNAL MEDICINE | Facility: CLINIC | Age: 68
End: 2018-09-07

## 2018-09-07 LAB — BACTERIA UR CULT: NORMAL

## 2018-09-07 RX ORDER — FLUCONAZOLE 200 MG/1
200 TABLET ORAL DAILY
Qty: 3 TABLET | Refills: 0 | Status: SHIPPED | OUTPATIENT
Start: 2018-09-07 | End: 2018-09-10

## 2018-09-07 NOTE — TELEPHONE ENCOUNTER
Notes:  Coverage for Dr Haney while out today    Patient culture grew Candida   She had CT scan done yesterday.    I had discussed case on Secure Chat with Care team, Dr. Huitron Urology, and Dr. Reese Nephsantos.    Patient port placement, possible Wed.    Renal advised 200 mg Diflucan daily for 3 days.    Urology will try and arrange follow up as well. Will work on that Monday morning with team.  ([phone])    I will call patient and make her aware of plan, and message sent to all for follow up.    Dr. Haney to call on Monday.

## 2018-09-10 ENCOUNTER — TELEPHONE (OUTPATIENT)
Dept: INTERNAL MEDICINE | Facility: CLINIC | Age: 68
End: 2018-09-10

## 2018-09-10 DIAGNOSIS — N83.8 OVARIAN MASS: Primary | ICD-10-CM

## 2018-09-10 RX ORDER — CEFUROXIME AXETIL 250 MG/1
250 TABLET ORAL DAILY
Qty: 7 TABLET | Refills: 0 | Status: SHIPPED | OUTPATIENT
Start: 2018-09-10 | End: 2019-01-10

## 2018-09-10 NOTE — PROGRESS NOTES
Subjective:       Patient ID: Rebeca Woodard is a 68 y.o. female.    Chief Complaint: No chief complaint on file.    HPI  Review of Systems    Objective:      Physical Exam    Assessment:       No diagnosis found.    Plan:   There are no diagnoses linked to this encounter.        Called Dr. Kris Valenzuela  Urology  [facility]    He is going to call patient and have Stent Removed prior to port placement.  Prescribing Ceftin to be taken in addition to Anti-fungal.    Attempted to call patient - did not answer.    Patient also needs Gyn/ Onc evaluation of ovarian mass.  Appears to have been lost to follow up after delaying removal.   Plan to touch base with Her Oncologist.      No Follow-up on file.

## 2018-09-10 NOTE — TELEPHONE ENCOUNTER
----- Message from May Cooley MD sent at 9/7/2018  3:04 PM CDT -----  Fit kIT negative  Send letter home.  Neg Screen

## 2018-09-10 NOTE — TELEPHONE ENCOUNTER
----- Message from Maryanne Shafer sent at 9/10/2018  8:09 AM CDT -----  Contact: self   Patient returning call. Please call back at [phone].    Thanks,  Maryanne Shafer

## 2018-09-11 ENCOUNTER — TELEPHONE (OUTPATIENT)
Dept: INTERNAL MEDICINE | Facility: CLINIC | Age: 68
End: 2018-09-11

## 2018-09-11 DIAGNOSIS — R19.09 OTHER INTRA-ABDOMINAL AND PELVIC SWELLING, MASS AND LUMP: ICD-10-CM

## 2018-09-11 DIAGNOSIS — R19.00 PELVIC MASS IN FEMALE: Primary | ICD-10-CM

## 2018-09-11 NOTE — TELEPHONE ENCOUNTER
----- Message from Jack Haney MD sent at 9/10/2018  5:35 PM CDT -----  Please arrange for patient to see Dr. Phuong mitchell/Onc  Ensure this is on one of her [place] days

## 2018-09-13 ENCOUNTER — TELEPHONE (OUTPATIENT)
Dept: INTERNAL MEDICINE | Facility: CLINIC | Age: 68
End: 2018-09-13

## 2018-09-13 NOTE — TELEPHONE ENCOUNTER
Called patient per Dr Haney's request to check on patient before she got her port for chemo treatment. /sl/

## 2018-09-13 NOTE — TELEPHONE ENCOUNTER
----- Message from Jack Haney MD sent at 9/13/2018  9:45 AM CDT -----  Contact: Patient  Please call and see if she needed anything. I'm glad it is getting placed!  ----- Message -----  From: Elena Renee LPN  Sent: 9/13/2018   9:44 AM  To: Jack Haney MD        ----- Message -----  From: Margarita Payne  Sent: 9/13/2018   9:41 AM  To: [facility] Staff    Patient called and stated she is getting her Port placed on Tuesday and she just wanted the doctor to know. She can be contacted at [phone].    Thanks,  Margarita

## 2018-09-21 ENCOUNTER — TELEPHONE (OUTPATIENT)
Dept: RADIOLOGY | Facility: HOSPITAL | Age: 68
End: 2018-09-21

## 2018-09-26 ENCOUNTER — TELEPHONE (OUTPATIENT)
Dept: RADIOLOGY | Facility: HOSPITAL | Age: 68
End: 2018-09-26

## 2018-09-27 ENCOUNTER — HOSPITAL ENCOUNTER (OUTPATIENT)
Dept: RADIOLOGY | Facility: HOSPITAL | Age: 68
Discharge: HOME | End: 2018-09-27
Attending: FAMILY MEDICINE
Payer: MEDICARE

## 2018-09-27 DIAGNOSIS — R19.00 PELVIC MASS IN FEMALE: ICD-10-CM

## 2018-09-27 PROCEDURE — 76830 TRANSVAGINAL US NON-OB: CPT | Mod: 26,,, | Performed by: RADIOLOGY

## 2018-09-27 PROCEDURE — 76856 US EXAM PELVIC COMPLETE: CPT | Mod: TC

## 2018-09-27 PROCEDURE — 76856 US EXAM PELVIC COMPLETE: CPT | Mod: 26,,, | Performed by: RADIOLOGY

## 2018-09-27 PROCEDURE — 76830 TRANSVAGINAL US NON-OB: CPT | Mod: TC

## 2018-10-02 ENCOUNTER — TELEPHONE (OUTPATIENT)
Dept: NEPHROLOGY | Facility: CLINIC | Age: 68
End: 2018-10-02

## 2018-10-02 NOTE — TELEPHONE ENCOUNTER
----- Message from Bernie Batista sent at 10/2/2018 12:12 PM CDT -----  Pt at [phone]//states she has an appt scheduled with Dr Reese on 10-4-18 at 8:30am/// she is needing to reschedule//is wanting an appt before the next available//please call//thanks/[facility]

## 2018-10-08 ENCOUNTER — OFFICE VISIT (OUTPATIENT)
Dept: NEPHROLOGY | Facility: CLINIC | Age: 68
End: 2018-10-08
Payer: MEDICARE

## 2018-10-08 VITALS
BODY MASS INDEX: 17.81 KG/M2 | HEIGHT: 62 IN | HEART RATE: 74 BPM | WEIGHT: 96.81 LBS | SYSTOLIC BLOOD PRESSURE: 120 MMHG | DIASTOLIC BLOOD PRESSURE: 60 MMHG

## 2018-10-08 DIAGNOSIS — I70.1 RIGHT RENAL ARTERY STENOSIS: ICD-10-CM

## 2018-10-08 DIAGNOSIS — N18.31 CHRONIC KIDNEY DISEASE (CKD) STAGE G3A/A1, MODERATELY DECREASED GLOMERULAR FILTRATION RATE (GFR) BETWEEN 45-59 ML/MIN/1.73 SQUARE METER AND ALBUMINURIA CREATININE RATIO LESS THAN 30 MG/G: ICD-10-CM

## 2018-10-08 DIAGNOSIS — N17.9 AKI (ACUTE KIDNEY INJURY): Primary | ICD-10-CM

## 2018-10-08 DIAGNOSIS — R31.9 HEMATURIA SYNDROME: ICD-10-CM

## 2018-10-08 DIAGNOSIS — E87.5 HYPERKALEMIA: ICD-10-CM

## 2018-10-08 DIAGNOSIS — N39.0 RECURRENT UTI (URINARY TRACT INFECTION): ICD-10-CM

## 2018-10-08 PROCEDURE — 99214 OFFICE O/P EST MOD 30 MIN: CPT | Mod: S$PBB,,, | Performed by: INTERNAL MEDICINE

## 2018-10-08 PROCEDURE — 99213 OFFICE O/P EST LOW 20 MIN: CPT | Mod: PBBFAC | Performed by: INTERNAL MEDICINE

## 2018-10-08 PROCEDURE — 99999 PR PBB SHADOW E&M-EST. PATIENT-LVL III: CPT | Mod: PBBFAC,,, | Performed by: INTERNAL MEDICINE

## 2018-10-08 NOTE — PATIENT INSTRUCTIONS
1.  Acute kidney injury on chronic kidney disease stage 3:  Patient has severe mucositis and radiation side effects along with side effects of chemotherapy.  Patient was hospitalized with acute kidney injury due to dehydration.  Patient is feeling some better now.  Patient has labs to be drawn tomorrow.  Patient has been following with multiple physicians in different cities.  Since the fact the patient is under chemotherapy and radiation therapy at this time I have recommended the patient to communicate and contact with her oncologist for further management and try to get the care under 1 institute where all the records and all the consultants are at the same place for continuity of care and better follow-up care.  This would avoid the patient to commute for more than an hour for each consultant.  Patient is supposed to have labs drawn tomorrow and can follow up with her chemotherapy as well as radiation as outlined by the labs to be drawn tomorrow.  I do not have labs since her discharge from the hospital which she was supposed to have done before my visit.  Clinically she seems to be stable with no hemodynamic compromise.  Patient is not on any ACE-inhibitor at this time.  For continuity of care and better management of adverse reactions from chemotherapy and radiation therapy I am deferring further management to her oncologist.  She also needs to follow up with all the consultants locally instead of traveling over an hour.  She is also on cisplatinum-based chemotherapy which is nephrotoxic but I cannot make any judgment or suggestions without any lab work follow-up which will be done tomorrow.    At this point I have my recommendation is for the patient to drink plenty of fluids.  Follow up with Oncology with labs tomorrow.  Follow-up with nephrologist locally until chemotherapy and radiation therapy is finished.  Follow-up with me as needed    Drink plenty of fluids to make urine look like water     Come back  to ER if cannot keep fluid down or with Nausea vomiting or diarrhea or any of the current symptoms get worse          P.r.n. follow-up

## 2018-10-08 NOTE — PROGRESS NOTES
Subjective:       Patient ID: Rebeca Woodard is a 68 y.o. White female who presents for new evaluation of Chronic Kidney Disease    Hematuria   Irritative symptoms do not include frequency or urgency. Pertinent negatives include no chills, dysuria, fever, flank pain, nausea or vomiting.      Patient is a 68-year-old with history of hypertension difficult to control on multiple medications.  Had a creatinine of 1.0 in the year 2017.  Most recently was seen by Dr. Jack Haney.  Creatinine had gone up 2.3 with hyperkalemia.  Patient was sent to the emergency room for further evaluation.  Repeat labs showed creatinine down to 1.9.  Urgent ultrasound for renal artery stenosis was ordered which was done yesterday.  Patient's renal ultrasound shows severe right-sided renal artery stenosis.  According to the patient and the family she has a history of left-sided stent placement on the left kidney in the past.  There is no evidence of left-sided stent on the ultrasound in the current study.  Patient is also suffering from hyperkalemia with severe peripheral artery disease.  Patient also has pyuria and hematuria with a negative urine culture.    July 2018 patient seen for acute kidney injury hyperkalemia and chronic kidney disease with ischemic nephropathy and lisinopril was stopped vascular surgery consulted for further management.  May need Urology while follow-up      8/2018 throat cancer pending XRT dr. Vu ; FUENTES ; ave SOHAIL better ; PET 8/3/2018 1.  Hypermetabolic lesion in the oropharynx consistent with malignancy. Regional metastatic disease to cervical lymph nodes. 2.  Pleuroparenchymal nodule at the left lung apex without associated hypermetabolic activity.3. Mild esophageal uptake likely in keeping with esophagitis or reflux disease.4. Mild increase in size of cystic left adnexal mass.5. Chronic left obstructive uropathy with ureteral stent in place. Proximal end of the stent is low-lying. Correlate for desired  position     September 2018 left sided port placed and start chemo on 9/19. patient hospitalized at [facility] with acute kidney injury.  Creatinine going up to 4.0 ultrasound showing small atrophic kidney on the left side with mild hydronephrosis.  Patient was hydrated.  Urology and nephrology consultations obtained in the hospital.  Patient discharged home.    October 2018 patient comes back after discharge from the hospital for acute kidney injury. Not eating 15 ib weight loss on Chemo and XRT ; soreness in mouth and dysphagia ; good appetite       Review of Systems   Constitutional: Negative for activity change, appetite change, chills, diaphoresis, fatigue, fever and unexpected weight change.   HENT: Negative for congestion, dental problem, drooling, postnasal drip, rhinorrhea and voice change.    Eyes: Negative for discharge.   Respiratory: Negative for apnea, cough, choking, chest tightness, shortness of breath, wheezing and stridor.    Cardiovascular: Negative for chest pain, palpitations and leg swelling.   Gastrointestinal: Negative for abdominal distention, blood in stool, constipation, diarrhea, nausea, rectal pain and vomiting.   Endocrine: Negative for cold intolerance, heat intolerance, polydipsia and polyuria.   Genitourinary: Positive for hematuria. Negative for decreased urine volume, difficulty urinating, dysuria, enuresis, flank pain, frequency and urgency.   Musculoskeletal: Negative for arthralgias, back pain, gait problem and joint swelling.   Skin: Negative for rash.   Allergic/Immunologic: Negative for food allergies and immunocompromised state.   Neurological: Negative for dizziness, tremors, syncope, numbness and headaches.   Hematological: Does not bruise/bleed easily.   Psychiatric/Behavioral: Negative for agitation, behavioral problems and self-injury. The patient is not nervous/anxious and is not hyperactive.    All other systems reviewed and are negative.      Objective:   BP  "120/60   Pulse 74   Ht 5' 2" (1.575 m)   Wt 43.9 kg (96 lb 12.5 oz)   BMI 17.70 kg/m²      Physical Exam   Constitutional: She is oriented to person, place, and time. No distress.   HENT:   Head: Normocephalic and atraumatic.   Nose: Nose normal.   Eyes: Conjunctivae and EOM are normal. Pupils are equal, round, and reactive to light.   Neck: Normal range of motion. No JVD present. No tracheal deviation present. No thyromegaly present.   Cardiovascular: Normal rate, regular rhythm and intact distal pulses. Exam reveals gallop and S4. Exam reveals no friction rub.   No murmur heard.  Pulses:       Carotid pulses are 1+ on the right side.       Radial pulses are 1+ on the right side.        Femoral pulses are 1+ on the right side with bruit, and on the left side with bruit.       Popliteal pulses are 1+ on the right side.   Peripheral arterial disease   Pulmonary/Chest: Effort normal and breath sounds normal. No respiratory distress. She has no wheezes. She has no rales. She exhibits no tenderness.   Abdominal: Soft. Bowel sounds are normal. She exhibits abdominal bruit. She exhibits no distension and no mass. There is no tenderness. No hernia.   Musculoskeletal: Normal range of motion. She exhibits no edema, tenderness or deformity.   Neurological: She is alert and oriented to person, place, and time. She has normal reflexes. She displays normal reflexes. No cranial nerve deficit. She exhibits normal muscle tone. Coordination normal.   Skin: Skin is warm. She is not diaphoretic. No erythema. There is pallor.   Psychiatric: She has a normal mood and affect. Her behavior is normal. Judgment and thought content normal.   Nursing note and vitals reviewed.        Lab Results   Component Value Date    CREATININE 1.7 (H) 09/04/2018    BUN 19 09/04/2018     09/04/2018    K 4.6 09/04/2018     09/04/2018    CO2 26 09/04/2018     Lab Results   Component Value Date    WBC 7.65 09/04/2018    HGB 10.8 (L) 09/04/2018 "    HCT 34.4 (L) 09/04/2018    MCV 88 09/04/2018     09/04/2018     Lab Results   Component Value Date    CALCIUM 10.3 09/04/2018    PHOS 3.9 08/02/2018     @RESUFAST(URICACID)    Assessment:    )    1. SOHAIL (acute kidney injury)    2. Chronic kidney disease (CKD) stage G3a/A1, moderately decreased glomerular filtration rate (GFR) between 45-59 mL/min/1.73 square meter and albuminuria creatinine ratio less than 30 mg/g    3. Hyperkalemia    4. Right renal artery stenosis    5. Hematuria syndrome    6. Recurrent UTI (urinary tract infection)        Plan:         1.  Acute kidney injury on chronic kidney disease stage 3:  Patient has severe mucositis and radiation side effects along with side effects of chemotherapy.  Patient was hospitalized with acute kidney injury due to dehydration.  Patient is feeling some better now.  Patient has labs to be drawn tomorrow.  Patient has been following with multiple physicians in different cities.  Since the fact the patient is under chemotherapy and radiation therapy at this time I have recommended the patient to communicate and contact with her oncologist for further management and try to get the care under 1 institute where all the records and all the consultants are at the same place for continuity of care and better follow-up care.  This would avoid the patient to commute for more than an hour for each consultant.  Patient is supposed to have labs drawn tomorrow and can follow up with her chemotherapy as well as radiation as outlined by the labs to be drawn tomorrow.  I do not have labs since her discharge from the hospital which she was supposed to have done before my visit.  Clinically she seems to be stable with no hemodynamic compromise.  Patient is not on any ACE-inhibitor at this time.  For continuity of care and better management of adverse reactions from chemotherapy and radiation therapy I am deferring further management to her oncologist.  She also needs to  follow up with all the consultants locally instead of traveling over an hour.  She is also on cisplatinum-based chemotherapy which is nephrotoxic but I cannot make any judgment or suggestions without any lab work follow-up which will be done tomorrow.    At this point I have my recommendation is for the patient to drink plenty of fluids.  Follow up with Oncology with labs tomorrow.  Follow-up with nephrologist locally until chemotherapy and radiation therapy is finished.  Follow-up with me as needed    P.r.n. follow-up with me at this point I am heading over further care to her oncologist for further management and for the choice of local nephrologist

## 2018-10-10 ENCOUNTER — TELEPHONE (OUTPATIENT)
Dept: NEPHROLOGY | Facility: CLINIC | Age: 68
End: 2018-10-10

## 2018-10-10 ENCOUNTER — TELEPHONE (OUTPATIENT)
Dept: GYNECOLOGIC ONCOLOGY | Facility: CLINIC | Age: 68
End: 2018-10-10

## 2018-10-10 NOTE — TELEPHONE ENCOUNTER
----- Message from Jody Tatum sent at 10/10/2018  2:11 PM CDT -----  Pt is returning a call from nurse.          Please call pt back at [phone]

## 2018-11-06 ENCOUNTER — TELEPHONE (OUTPATIENT)
Dept: NEPHROLOGY | Facility: CLINIC | Age: 68
End: 2018-11-06

## 2018-11-06 NOTE — TELEPHONE ENCOUNTER
----- Message from Juan Jose Delgado sent at 11/6/2018  9:46 AM CST -----  Contact: pt  She is calling to be fit into the schedule on 11/19/18 that morning due to a request from  to follow up after the completion of all her treatments, please advise [phone] (home)

## 2018-11-06 NOTE — TELEPHONE ENCOUNTER
Returned call to patient. Informed patient that Dr. Reese will not be in clinic next. Offered to be scheduled tomorrow or with another provider. She declined and states that she will see Dr. Haney next week and let him review her labs since she has stopped Chemo.

## 2018-11-14 ENCOUNTER — TELEPHONE (OUTPATIENT)
Dept: INTERNAL MEDICINE | Facility: CLINIC | Age: 68
End: 2018-11-14

## 2018-11-14 NOTE — TELEPHONE ENCOUNTER
----- Message from Magnolia Carranza sent at 11/14/2018  3:01 PM CST -----  Contact: Dione shannon/ [facility] [phone]  Dione wanted to inform that the peg tube may be infected because its red and swollen.

## 2018-11-14 NOTE — TELEPHONE ENCOUNTER
[facility] states pt has a peg tube  States its red and swollen   And draining around the site  Requesting some oral ABT and a order for Bactroban to the site  Please advise

## 2018-11-15 ENCOUNTER — TELEPHONE (OUTPATIENT)
Dept: INTERNAL MEDICINE | Facility: CLINIC | Age: 68
End: 2018-11-15

## 2018-11-15 RX ORDER — MUPIROCIN 20 MG/G
OINTMENT TOPICAL 3 TIMES DAILY
Qty: 30 G | Refills: 0 | Status: SHIPPED | OUTPATIENT
Start: 2018-11-15 | End: 2019-01-10

## 2018-11-15 NOTE — TELEPHONE ENCOUNTER
Is peg tube functioning?  Any fever?  Any recent Creatinine/renal function?    Dr. Haney is out of office.  Is patient taking anything by mouth or is everything by peg tube?    Is patient able to be seen for this or totally home bound?

## 2018-11-15 NOTE — TELEPHONE ENCOUNTER
----- Message from Annie Becker sent at 11/15/2018 10:03 AM CST -----  Contact: Dione- [facility]  Dione left message on yesterday regarding pt's PEG tube being infected and hasn't heard back from anyone. Please call Dione back at [phone].      Thanks,   Annie Becker

## 2018-11-15 NOTE — TELEPHONE ENCOUNTER
The home health nurse left message, is it draining around it now and is there swelling or redness around it?  Does he have antibiotic cream? When does the home health nurse come back to see her?   We can do topical cream to the area around it, but if its looking worse, then we have to check Kidney function and decide on antibiotics through the Peg tube.

## 2018-11-15 NOTE — TELEPHONE ENCOUNTER
----- Message from Elena Renee LPN sent at 11/15/2018 10:47 AM CST -----  Dione EDWARDS nurse wants to know can some bactriban be called in for the patient for redness at the peg tube site?. /brice/

## 2018-11-15 NOTE — TELEPHONE ENCOUNTER
Spoke with the  nurse about the patient's peg tube.  She wants to know can we call in some bactriban for that patient. I told her that I spoke with the patient and she advised me that the tube is fine and functioning properly. /sl/

## 2018-11-16 ENCOUNTER — TELEPHONE (OUTPATIENT)
Dept: INTERNAL MEDICINE | Facility: CLINIC | Age: 68
End: 2018-11-16

## 2018-11-16 DIAGNOSIS — R62.51 FAILURE TO THRIVE (0-17): Primary | ICD-10-CM

## 2018-11-16 DIAGNOSIS — C10.9 OROPHARYNGEAL CANCER: ICD-10-CM

## 2018-11-16 DIAGNOSIS — Z71.89 ENCOUNTER FOR TUBE FEEDING INSTRUCTION: Primary | ICD-10-CM

## 2018-11-16 DIAGNOSIS — R62.7 FAILURE TO THRIVE IN ADULT: ICD-10-CM

## 2018-11-16 NOTE — TELEPHONE ENCOUNTER
Spoke with Consuelo with [facility]  about the patient needing to sign a form saying that she will pay for nutrition services if it is not covered by her insurance.  She informed me that the patient does not want the the pump she wants to continue to do the bolus feed. /sl/

## 2018-11-16 NOTE — TELEPHONE ENCOUNTER
I put in a referral in Epic, and because she is Medicare it ask her to do a ABN to state that they may not cover these services.    I was unable to send the referral in Epic      She can have a nutritional consult/dietary consult.  Please please notify home health that the ABN needs to be signed, as this service may not be covered. I am not sure if its covered under her home health or not.    You can give a verbal order for this consult, but please document that you spoke to patient and HH about the coverage.    Dr. Cooley

## 2018-11-16 NOTE — TELEPHONE ENCOUNTER
----- Message from Elena Renee LPN sent at 11/15/2018  4:41 PM CST -----  Referral to nutritionist

## 2018-11-16 NOTE — TELEPHONE ENCOUNTER
I still would like to see if Nutrition/Dietary will review to make sure she is taking in the correct amt since she is only getting nutrition and water by G tube.    Please ask home health to review this order. ABN printed.

## 2018-11-16 NOTE — TELEPHONE ENCOUNTER
----- Message from May Cooley MD sent at 11/16/2018  7:12 AM CST -----  Please also attempt to contact Dr. Smith as well.    ----- Message -----  From: Elena Renee LPN  Sent: 11/15/2018   2:40 PM  To: MD Sue Mcrae with [facility] called about getting a order in for the patient to have a nutritionist to come to her home.  She was the patient this past weekend and the patient is bolus feeding herself through her peg tube, but the orders from the hospital says she needs to have 30cc per hour via pump.  The patient does not have a pump at her home.  The peg tube was put in by Dr Smith.  Sue say she tried to contact the Dr and was unsuccessful.  The peg tube was put on in an out patient setting.

## 2018-11-16 NOTE — TELEPHONE ENCOUNTER
Tried to get in contact with Dr Frederick Smith with [facility] and was unsuccessful.  The contact number that I was given is [phone].

## 2018-11-30 DIAGNOSIS — Z12.39 BREAST CANCER SCREENING: ICD-10-CM

## 2018-12-18 RX ORDER — BUSPIRONE HYDROCHLORIDE 10 MG/1
10 TABLET ORAL 2 TIMES DAILY
Qty: 180 TABLET | Refills: 0 | Status: SHIPPED | OUTPATIENT
Start: 2018-12-18 | End: 2019-01-10

## 2018-12-18 NOTE — TELEPHONE ENCOUNTER
----- Message from Annie Becker sent at 12/18/2018  9:06 AM CST -----  Contact: self  Refill request. Please call back at [phone].    1. What is the name of the medication you are requesting? Buspirone  2. What is the dose? 10 mg  3. How do you take the medication? Orally, topically, etc? orally  4. How often do you take this medication? 1 tablet by mouth three times a day  5. Do you need a 30 day or 90 day supply? 90  6. How many refills are you requesting? n/a  7. What is your preferred pharmacy and location of the pharmacy?     Pt uses:    [facility] [id number] [place] [address] [address] AT [facility] [address] [address] [address] [facility] [address]  Phone: [phone] Fax: [phone]      8. Who can we contact with further questions? N/a    Thanks,   Annie Becker

## 2019-01-10 ENCOUNTER — TELEPHONE (OUTPATIENT)
Dept: INTERNAL MEDICINE | Facility: CLINIC | Age: 69
End: 2019-01-10

## 2019-01-10 ENCOUNTER — LAB VISIT (OUTPATIENT)
Dept: LAB | Facility: HOSPITAL | Age: 69
End: 2019-01-10
Payer: MEDICARE

## 2019-01-10 ENCOUNTER — OFFICE VISIT (OUTPATIENT)
Dept: INTERNAL MEDICINE | Facility: CLINIC | Age: 69
End: 2019-01-10
Payer: MEDICARE

## 2019-01-10 VITALS
RESPIRATION RATE: 16 BRPM | OXYGEN SATURATION: 94 % | SYSTOLIC BLOOD PRESSURE: 150 MMHG | HEART RATE: 84 BPM | BODY MASS INDEX: 19.86 KG/M2 | TEMPERATURE: 97 F | HEIGHT: 61 IN | WEIGHT: 105.19 LBS | DIASTOLIC BLOOD PRESSURE: 60 MMHG

## 2019-01-10 DIAGNOSIS — Z09 HOSPITAL DISCHARGE FOLLOW-UP: Primary | ICD-10-CM

## 2019-01-10 DIAGNOSIS — F41.9 ANXIETY: ICD-10-CM

## 2019-01-10 DIAGNOSIS — Z09 HOSPITAL DISCHARGE FOLLOW-UP: ICD-10-CM

## 2019-01-10 DIAGNOSIS — I10 ESSENTIAL HYPERTENSION: ICD-10-CM

## 2019-01-10 LAB
ALBUMIN SERPL BCP-MCNC: 2.7 G/DL
ALP SERPL-CCNC: 65 U/L
ALT SERPL W/O P-5'-P-CCNC: 11 U/L
ANION GAP SERPL CALC-SCNC: 11 MMOL/L
AST SERPL-CCNC: 16 U/L
BASOPHILS # BLD AUTO: 0.02 K/UL
BASOPHILS NFR BLD: 0.3 %
BILIRUB SERPL-MCNC: 0.4 MG/DL
BUN SERPL-MCNC: 54 MG/DL
CALCIUM SERPL-MCNC: 9.2 MG/DL
CHLORIDE SERPL-SCNC: 93 MMOL/L
CO2 SERPL-SCNC: 26 MMOL/L
CREAT SERPL-MCNC: 2 MG/DL
DIFFERENTIAL METHOD: ABNORMAL
EOSINOPHIL # BLD AUTO: 0.2 K/UL
EOSINOPHIL NFR BLD: 2.3 %
ERYTHROCYTE [DISTWIDTH] IN BLOOD BY AUTOMATED COUNT: 16 %
EST. GFR  (AFRICAN AMERICAN): 29.3 ML/MIN/1.73 M^2
EST. GFR  (NON AFRICAN AMERICAN): 25.5 ML/MIN/1.73 M^2
GLUCOSE SERPL-MCNC: 105 MG/DL
HCT VFR BLD AUTO: 26.3 %
HGB BLD-MCNC: 8.4 G/DL
IMM GRANULOCYTES # BLD AUTO: 0.03 K/UL
IMM GRANULOCYTES NFR BLD AUTO: 0.4 %
LYMPHOCYTES # BLD AUTO: 0.5 K/UL
LYMPHOCYTES NFR BLD: 6 %
MCH RBC QN AUTO: 27.7 PG
MCHC RBC AUTO-ENTMCNC: 31.9 G/DL
MCV RBC AUTO: 87 FL
MONOCYTES # BLD AUTO: 0.8 K/UL
MONOCYTES NFR BLD: 9.6 %
NEUTROPHILS # BLD AUTO: 6.4 K/UL
NEUTROPHILS NFR BLD: 81.4 %
NRBC BLD-RTO: 0 /100 WBC
PLATELET # BLD AUTO: 341 K/UL
PMV BLD AUTO: 9.6 FL
POTASSIUM SERPL-SCNC: 4.7 MMOL/L
PROT SERPL-MCNC: 7.4 G/DL
RBC # BLD AUTO: 3.03 M/UL
SODIUM SERPL-SCNC: 130 MMOL/L
WBC # BLD AUTO: 7.85 K/UL

## 2019-01-10 PROCEDURE — 99999 PR PBB SHADOW E&M-EST. PATIENT-LVL III: CPT | Mod: PBBFAC,,, | Performed by: FAMILY MEDICINE

## 2019-01-10 PROCEDURE — 80053 COMPREHEN METABOLIC PANEL: CPT

## 2019-01-10 PROCEDURE — 99214 PR OFFICE/OUTPT VISIT, EST, LEVL IV, 30-39 MIN: ICD-10-PCS | Mod: S$PBB,,, | Performed by: FAMILY MEDICINE

## 2019-01-10 PROCEDURE — 99213 OFFICE O/P EST LOW 20 MIN: CPT | Mod: PBBFAC | Performed by: FAMILY MEDICINE

## 2019-01-10 PROCEDURE — 99999 PR PBB SHADOW E&M-EST. PATIENT-LVL III: ICD-10-PCS | Mod: PBBFAC,,, | Performed by: FAMILY MEDICINE

## 2019-01-10 PROCEDURE — 85025 COMPLETE CBC W/AUTO DIFF WBC: CPT

## 2019-01-10 PROCEDURE — 99214 OFFICE O/P EST MOD 30 MIN: CPT | Mod: S$PBB,,, | Performed by: FAMILY MEDICINE

## 2019-01-10 PROCEDURE — 36415 COLL VENOUS BLD VENIPUNCTURE: CPT

## 2019-01-10 RX ORDER — ALPRAZOLAM 0.25 MG/1
TABLET ORAL
Refills: 0 | COMMUNITY
Start: 2019-01-02 | End: 2019-01-10 | Stop reason: SDUPTHER

## 2019-01-10 RX ORDER — OXYCODONE AND ACETAMINOPHEN 10; 325 MG/1; MG/1
1 TABLET ORAL EVERY 4 HOURS PRN
COMMUNITY
End: 2021-06-20 | Stop reason: CLARIF

## 2019-01-10 RX ORDER — ALPRAZOLAM 0.25 MG/1
0.25 TABLET ORAL 2 TIMES DAILY PRN
Qty: 20 TABLET | Refills: 0 | Status: SHIPPED | OUTPATIENT
Start: 2019-01-10

## 2019-01-10 RX ORDER — CLONIDINE HYDROCHLORIDE 0.1 MG/1
0.1 TABLET ORAL 3 TIMES DAILY
COMMUNITY
End: 2021-06-20 | Stop reason: CLARIF

## 2019-01-10 RX ORDER — HYDRALAZINE HYDROCHLORIDE 50 MG/1
TABLET, FILM COATED ORAL
Refills: 0 | COMMUNITY
Start: 2019-01-02 | End: 2021-06-20 | Stop reason: CLARIF

## 2019-01-10 RX ORDER — CLONIDINE 0.2 MG/24H
PATCH, EXTENDED RELEASE TRANSDERMAL
Refills: 1 | COMMUNITY
Start: 2018-11-29 | End: 2019-01-10

## 2019-01-10 RX ORDER — AMLODIPINE BESYLATE 10 MG/1
10 TABLET ORAL DAILY
Qty: 30 TABLET | Refills: 0 | Status: SHIPPED | OUTPATIENT
Start: 2019-01-10 | End: 2021-06-20 | Stop reason: CLARIF

## 2019-01-10 NOTE — TELEPHONE ENCOUNTER
----- Message from Juan Jose Delgado sent at 1/10/2019  3:09 PM CST -----  Contact: Charlotte-Pt's daughter  She is calling in regards to discussing the pt's new primary care provider information, please advise [phone]

## 2019-01-10 NOTE — PROGRESS NOTES
Subjective:       Patient ID: Rebeca Woodard is a 68 y.o. female.    Chief Complaint: Follow-up (hosp f/u)    HPI     69 yo F    Anxiety  CKD - w/ Recent SOHAIL  Esophageal Cancer  H/O Colon Can - s/p colon resection/xrt/ 2009   L. Ovarian Cyst - drained at [facility] 2015 - Left Stent IN place  HCV - s/p Rachel  L. Ureter stent - replaced  H/O UTI    Has  PEG tube  Hospital follow-up  Discharged Jan 2nd  ( 2nd admission - )    Was admitted Dec 30th - d/c Jan 2nd  At [place] [place]    Per Admission note -  Admitted for -  Severe hyponatremia  UTI  Pneumonia  SOHAIL on CKD  Anemia - h/h 7.9/23.2        Currently feeling weak  Swallowing hurts  Tries to take food by mouth - but using PEG    Has had Radiation and Chemo  Current plan per patient - monitoring - with PET scan set up for 1 month  Will See Dr. Mario - q 3 months    Has Home health nurse    Breathing - is Fine  Not coughing    Urination is fine  No burning      Missed gyn / onc appt -       Review of Systems   Constitutional: Positive for activity change. Negative for chills and fever.   HENT: Positive for trouble swallowing.    Eyes: Negative for discharge.   Respiratory: Negative for wheezing.    Cardiovascular: Negative for chest pain and palpitations.   Gastrointestinal: Negative for constipation, diarrhea and vomiting.   Genitourinary: Negative for difficulty urinating and hematuria.   Skin: Negative for color change.   Neurological: Negative for headaches.   Psychiatric/Behavioral: Positive for dysphoric mood.       Objective:       Vitals:    01/10/19 0905   BP: (!) 150/60   Pulse: 84   Resp: 16   Temp: 97.4 °F (36.3 °C)       Physical Exam   Constitutional: She is oriented to person, place, and time. She appears well-developed and well-nourished. She does not have a sickly appearance. No distress.   HENT:   Head: Normocephalic and atraumatic.   Right Ear: External ear normal.   Left Ear: External ear normal.   Eyes: Conjunctivae, EOM and lids are normal.   Neck:  Trachea normal, normal range of motion and full passive range of motion without pain.   Cardiovascular: Normal rate, regular rhythm and normal heart sounds.   Pulmonary/Chest: Effort normal and breath sounds normal. No stridor. No respiratory distress.   Abdominal: Soft. Normal appearance and bowel sounds are normal. She exhibits no distension. There is no tenderness. There is no guarding. No hernia.   Musculoskeletal: Normal range of motion.   Lymphadenopathy:     She has no cervical adenopathy.   Neurological: She is alert and oriented to person, place, and time. She is not disoriented.   Skin: Skin is warm, dry and intact. No rash noted. She is not diaphoretic.   Psychiatric: She has a normal mood and affect. Her speech is normal and behavior is normal. Thought content normal.       Assessment:       1. Hospital discharge follow-up    2. Essential hypertension    3. Anxiety        Plan:     Patient is a very pleasant woman, with a complicated case.  She has a confusing history and while is cognitively intact - is somewhat unable to give accurate history.    Recent admission   Feeling improved  Base labs    HTN:  Not controlled  Med list not correct  Has had changes  Increased amlodipine 5 to 10 mg  At present should be on amlodipine 10, clonidine 0.1mg, hydralazine 50 mg.         Recent PNA/ UTI  Seems improved    Has PEG tube    Patient currently followed by specialist at [facility] and [facility].  They will likely follow up with [facility] and attempt to get all care there given distance from home  Ensure has all MDs needed.  Patient and daughter seem to be uncertain about things, have missed appts, and patient has a history of follow up issues. I do feel having all care with local doctors under one system would be better for continuity. I reviewed [facility] note - and am in agreement.  Ensured patient and daughter I have no issues at all  continuing to be her PCP - and would not mind doing so if desired - but  practically speaking it would be prudent to stay locally and within one system.       Missed Gyn/Onc Appt needs f/u   Needs urology Follow up  Needs Nephrology  Follow up    F/u with me in 2 weeks - with intention to cancel this appt if she meets with a new PCP - she and daughter want find a new PCP out of geographic convienence- I will discuss with  If she does not do so - I still  want to see her in 2 weeks to ensure has followup and doing well     Anxiety  Will refill xanax  Discussed opiate risk  Patient and daughter understand risk

## 2019-01-10 NOTE — TELEPHONE ENCOUNTER
Pt daughter says her mother found a PCP Dr. SOUSA at [place] his number is [phone] 01/16/2019

## 2019-03-04 ENCOUNTER — TELEPHONE (OUTPATIENT)
Dept: ADMINISTRATIVE | Facility: CLINIC | Age: 69
End: 2019-03-04

## 2019-03-04 NOTE — TELEPHONE ENCOUNTER
Home Health SOC 01/03/2019 - 03/03/2019 with [facility] ([place]) - Dr. Jack Haney. PT services.

## 2019-03-14 ENCOUNTER — PES CALL (OUTPATIENT)
Dept: ADMINISTRATIVE | Facility: CLINIC | Age: 69
End: 2019-03-14

## 2019-08-18 ENCOUNTER — OUTSIDE PLACE OF SERVICE (OUTPATIENT)
Dept: HEMATOLOGY/ONCOLOGY | Facility: CLINIC | Age: 69
End: 2019-08-18
Payer: MEDICARE

## 2019-08-18 PROCEDURE — 99223 1ST HOSP IP/OBS HIGH 75: CPT | Mod: ,,, | Performed by: INTERNAL MEDICINE

## 2019-08-18 PROCEDURE — 99223 PR INITIAL HOSPITAL CARE,LEVL III: ICD-10-PCS | Mod: ,,, | Performed by: INTERNAL MEDICINE

## 2019-11-13 ENCOUNTER — PATIENT OUTREACH (OUTPATIENT)
Dept: ADMINISTRATIVE | Facility: CLINIC | Age: 69
End: 2019-11-13

## 2020-01-07 ENCOUNTER — OUTSIDE PLACE OF SERVICE (OUTPATIENT)
Dept: ADMINISTRATIVE | Facility: OTHER | Age: 70
End: 2020-01-07
Payer: MEDICARE

## 2020-01-07 PROCEDURE — 99221 PR INITIAL HOSPITAL CARE,LEVL I: ICD-10-PCS | Mod: ,,, | Performed by: THORACIC SURGERY (CARDIOTHORACIC VASCULAR SURGERY)

## 2020-01-07 PROCEDURE — 99221 1ST HOSP IP/OBS SF/LOW 40: CPT | Mod: ,,, | Performed by: THORACIC SURGERY (CARDIOTHORACIC VASCULAR SURGERY)

## 2020-01-08 ENCOUNTER — OUTSIDE PLACE OF SERVICE (OUTPATIENT)
Dept: ADMINISTRATIVE | Facility: OTHER | Age: 70
End: 2020-01-08
Payer: MEDICARE

## 2020-01-08 PROCEDURE — 36589 PR REMOVAL TUNNELED CV CATH W/O SUBQ PORT OR PUMP: ICD-10-PCS | Mod: 51,,, | Performed by: THORACIC SURGERY (CARDIOTHORACIC VASCULAR SURGERY)

## 2020-01-08 PROCEDURE — 36589 REMOVAL TUNNELED CV CATH: CPT | Mod: 51,,, | Performed by: THORACIC SURGERY (CARDIOTHORACIC VASCULAR SURGERY)

## 2020-01-08 PROCEDURE — 36558 PR INSERT TUNNELED CV CATH W/O PORT OR PUMP: ICD-10-PCS | Mod: RT,,, | Performed by: THORACIC SURGERY (CARDIOTHORACIC VASCULAR SURGERY)

## 2020-01-08 PROCEDURE — 36558 INSERT TUNNELED CV CATH: CPT | Mod: RT,,, | Performed by: THORACIC SURGERY (CARDIOTHORACIC VASCULAR SURGERY)

## 2020-01-08 PROCEDURE — 77001 FLUOROGUIDE FOR VEIN DEVICE: CPT | Mod: 26,,, | Performed by: THORACIC SURGERY (CARDIOTHORACIC VASCULAR SURGERY)

## 2020-01-08 PROCEDURE — 77001 CHG FLUOROGUIDE CNTRL VEN ACCESS,PLACE,REPLACE,REMOVE: ICD-10-PCS | Mod: 26,,, | Performed by: THORACIC SURGERY (CARDIOTHORACIC VASCULAR SURGERY)

## 2020-02-10 ENCOUNTER — EXTERNAL HOSPITAL ADMISSION (OUTPATIENT)
Dept: ADMINISTRATIVE | Facility: CLINIC | Age: 70
End: 2020-02-10

## 2020-02-10 ENCOUNTER — PATIENT OUTREACH (OUTPATIENT)
Dept: ADMINISTRATIVE | Facility: CLINIC | Age: 70
End: 2020-02-10

## 2020-02-10 NOTE — PROGRESS NOTES
C3 nurse attempted to contact patient. The following occurred:   C3 nurse attempted to contact Rebeca Woodard  for a TCC post hospital discharge follow up call. The patient is unable to conduct the call @ this time. Patient   RCB tomorrow 02/11, patient currently on dialysis    The patient does not have a scheduled HOSFU appointment within 7-14 days post hospital discharge date 02/07/2020. Message sent to physician staff for scheduling needs.

## 2020-02-11 NOTE — PROGRESS NOTES
Completed TCC call for patient r/t recent hospital visit at [facility], patient states she doesn't recall what she was diagnosed with, had a procedure done, and may have been prescribed an antibiotic but patient states can't recall at this time. Patient states she has follow up appointments but can't recall at the moment. Advised patient of OOC # for any needs. Patient verbalized understanding. - Frederic Garcia RN

## 2020-02-11 NOTE — PATIENT INSTRUCTIONS
Unknown Causes of Abdominal Pain (Female)    The exact cause of your abdominal (stomach) pain is not clear. This does not mean that this is something to worry about. Everyone likes to know the exact cause of the problem, but sometimes with abdominal pain, there is no clear-cut cause, and this could be a good thing. The good news is that your symptoms can be treated, and you will feel better.   Your condition does not seem serious now; however, sometimes the signs of a serious problem may take more time to appear. For this reason, it is important for you to watch for any new symptoms, problems, or worsening of your condition.  Over the next few days, the abdominal pain may come and go, or be continuous. Other common symptoms can include nausea and vomiting. Sometimes it can be difficult to tell if you feel nauseous, you may just feel bad and not associate that feeling with nausea. Constipation, diarrhea, and a fever may go along with the pain.  The pain may continue even if treated correctly over the following days. Depending on how things go, sometimes the cause can become clear and may require further or different treatment. Additional evaluations, medications, or tests may also be needed.  Home care  Your healthcare provider may prescribe medicine for pain, symptoms, or an infection.  Follow the healthcare provider's instructions for taking these medicines.  General care  · Rest as much as you can until your next exam. No strenuous activities.  · Try to find positions that ease discomfort. A small pillow placed on the abdomen may help relieve pain.  · Something warm on your abdomen (such as a heating pad) may help, but be careful not to burn yourself.  Diet  · Do not force yourself to eat, especially if having cramps, vomiting, or diarrhea.  · Water is important so you do not get dehydrated. Soup may also be good. Sports drinks may also help, especially if they are not too acidic. Make sure you don't drink  sugary drinks as this can make things worse. Take liquids in small amounts. Do not guzzle them.  · Caffeine sometimes makes the pain and cramping worse.  · Avoid dairy products if you have vomiting or diarrhea.  · Don't eat large amounts at a time. Wait a few minutes between bites.  · Eat a diet low in fiber (called a low-residue diet). Foods allowed include refined breads, white rice, fruit and vegetable juices without pulp, tender meats. These foods will pass more easily through the intestine.  · Avoid whole-grain foods, whole fruits and vegetables, meats, seeds and nuts, fried or fatty foods, dairy, alcohol and spicy foods until your symptoms go away.  Follow-up care  Follow up with your healthcare provider, or as advised, if your pain does not begin to improve in the next 24 hours.  Call 911  Call 911 if any of these occur:  · Trouble breathing  · Confusion  · Fainting or loss of consciousness  · Rapid heart rate  · Seizure  When to seek medical advice  Call your healthcare provider right away if any of these occur:  · Pain gets worse or moves to the right lower abdomen  · New or worsening vomiting or diarrhea  · Swelling of the abdomen  · Unable to pass stool for more than 3 days  · Fever of 100.4ºF (38ºC) or higher, or as directed by your healthcare provider.  · Blood in vomit or bowel movements (dark red or black color)  · Jaundice (yellow color of eyes and skin)  · Weakness, dizziness  · Chest, arm, back, neck or jaw pain  · Unexpected vaginal bleeding or missed period  · Can't keep down liquids or water and are getting dehydrated    © 6328-6334 [employer]. [address] [place] [address], [place] [address]. All rights reserved. This information is not intended as a substitute for professional medical care. Always follow your healthcare professional's instructions.

## 2020-06-12 ENCOUNTER — PES CALL (OUTPATIENT)
Dept: ADMINISTRATIVE | Facility: CLINIC | Age: 70
End: 2020-06-12

## 2020-08-06 ENCOUNTER — PATIENT OUTREACH (OUTPATIENT)
Dept: ADMINISTRATIVE | Facility: HOSPITAL | Age: 70
End: 2020-08-06

## 2021-06-20 PROBLEM — Z23 ENCOUNTER FOR IMMUNIZATION: Status: ACTIVE | Noted: 2019-10-16

## 2021-06-20 PROBLEM — R07.9 CHEST PAIN, UNSPECIFIED: Status: ACTIVE | Noted: 2019-10-14

## 2021-06-20 PROBLEM — R06.03 ACUTE RESPIRATORY DISTRESS: Status: ACTIVE | Noted: 2019-08-22

## 2021-06-20 PROBLEM — D63.0 ANEMIA IN NEOPLASTIC DISEASE: Status: ACTIVE | Noted: 2018-11-13

## 2021-06-20 PROBLEM — E87.79 OTHER FLUID OVERLOAD: Status: ACTIVE | Noted: 2020-03-30

## 2021-06-20 PROBLEM — E43 PROTEIN-CALORIE MALNUTRITION, SEVERE: Status: ACTIVE | Noted: 2018-10-30

## 2021-06-20 PROBLEM — N17.9 ACUTE RENAL FAILURE: Status: ACTIVE | Noted: 2018-09-27

## 2021-06-20 PROBLEM — N18.6 ESRD ON DIALYSIS: Status: ACTIVE | Noted: 2019-10-31

## 2021-06-20 PROBLEM — A41.9 SEVERE SEPSIS: Status: ACTIVE | Noted: 2021-06-02

## 2021-06-20 PROBLEM — E44.1 PROTEIN-CALORIE MALNUTRITION, MILD: Status: ACTIVE | Noted: 2019-10-23

## 2021-06-20 PROBLEM — D68.9 COAGULATION DEFECT, UNSPECIFIED: Status: ACTIVE | Noted: 2019-10-14

## 2021-06-20 PROBLEM — Z71.89 ACP (ADVANCE CARE PLANNING): Status: ACTIVE | Noted: 2021-06-20

## 2021-06-20 PROBLEM — N13.5 URETERAL STRICTURE, LEFT: Status: ACTIVE | Noted: 2018-09-12

## 2021-06-20 PROBLEM — I82.C11 ACUTE EMBOLISM AND THROMBOSIS OF RIGHT INTERNAL JUGULAR VEIN: Status: ACTIVE | Noted: 2021-01-28

## 2021-06-20 PROBLEM — T80.211A: Status: ACTIVE | Noted: 2020-01-10

## 2021-06-20 PROBLEM — I50.33 ACUTE ON CHRONIC DIASTOLIC CONGESTIVE HEART FAILURE: Status: ACTIVE | Noted: 2019-09-14

## 2021-06-20 PROBLEM — E87.1 HYPONATREMIA: Status: ACTIVE | Noted: 2018-12-31

## 2021-06-20 PROBLEM — R06.02 SOB (SHORTNESS OF BREATH): Status: ACTIVE | Noted: 2019-10-14
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
## 2021-06-20 PROBLEM — Z85.819 HISTORY OF OROPHARYNGEAL CANCER: Status: ACTIVE | Noted: 2021-02-22

## 2021-06-20 PROBLEM — E87.20 LACTIC ACIDOSIS: Status: ACTIVE | Noted: 2021-06-02

## 2021-06-20 PROBLEM — I50.20 UNSPECIFIED SYSTOLIC (CONGESTIVE) HEART FAILURE: Status: ACTIVE | Noted: 2019-10-14

## 2021-06-20 PROBLEM — N18.4 CHRONIC RENAL DISEASE, STAGE IV: Status: ACTIVE | Noted: 2019-02-02
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
## 2021-06-20 PROBLEM — N25.81 SECONDARY HYPERPARATHYROIDISM OF RENAL ORIGIN: Status: ACTIVE | Noted: 2019-10-14

## 2021-06-20 PROBLEM — Z99.2 ESRD ON DIALYSIS: Status: ACTIVE | Noted: 2019-10-31

## 2021-06-20 PROBLEM — D64.9 SYMPTOMATIC ANEMIA: Status: ACTIVE | Noted: 2021-06-20

## 2021-06-20 PROBLEM — T88.4XXA DIFFICULT AIRWAY: Status: ACTIVE | Noted: 2020-01-08

## 2021-06-20 PROBLEM — C10.9 MALIGNANT NEOPLASM OF OROPHARYNX, UNSPECIFIED: Status: ACTIVE | Noted: 2018-11-13

## 2021-06-20 PROBLEM — E87.5 HYPERKALEMIA: Status: ACTIVE | Noted: 2020-10-26

## 2021-06-20 PROBLEM — D50.9 IRON DEFICIENCY ANEMIA, UNSPECIFIED: Status: ACTIVE | Noted: 2019-10-14

## 2021-06-20 PROBLEM — E87.3 ALKALOSIS: Status: ACTIVE | Noted: 2019-08-22

## 2021-06-20 PROBLEM — J96.01 ACUTE RESPIRATORY FAILURE WITH HYPOXEMIA: Status: ACTIVE | Noted: 2019-09-06

## 2021-06-20 PROBLEM — D63.1 ANEMIA IN CHRONIC KIDNEY DISEASE: Status: ACTIVE | Noted: 2019-10-14

## 2021-06-20 PROBLEM — Z93.1 PEG (PERCUTANEOUS ENDOSCOPIC GASTROSTOMY) STATUS: Status: ACTIVE | Noted: 2021-01-28

## 2021-06-20 PROBLEM — I10 ACCELERATED HYPERTENSION: Status: ACTIVE | Noted: 2017-06-06

## 2021-06-20 PROBLEM — N18.9 ANEMIA IN CHRONIC KIDNEY DISEASE: Status: ACTIVE | Noted: 2019-10-14

## 2021-06-20 PROBLEM — R10.84 GENERALIZED ABDOMINAL PAIN: Status: ACTIVE | Noted: 2020-02-04

## 2021-06-20 PROBLEM — J18.9 PNEUMONIA DUE TO INFECTIOUS ORGANISM: Status: ACTIVE | Noted: 2019-01-28

## 2021-06-20 PROBLEM — R65.20 SEVERE SEPSIS: Status: ACTIVE | Noted: 2021-06-02

## 2021-06-20 PROBLEM — C18.9 MALIGNANT NEOPLASM OF COLON, UNSPECIFIED: Status: ACTIVE | Noted: 2019-10-14

## 2021-06-20 PROBLEM — G93.41 ACUTE METABOLIC ENCEPHALOPATHY: Status: ACTIVE | Noted: 2020-12-17

## 2021-06-20 PROBLEM — R63.6 UNDERWEIGHT: Status: ACTIVE | Noted: 2019-11-08
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
## 2021-06-20 PROBLEM — F33.1 MAJOR DEPRESSIVE DISORDER, RECURRENT, MODERATE: Status: ACTIVE | Noted: 2021-01-28

## 2021-06-20 PROBLEM — R53.1 WEAKNESS: Status: ACTIVE | Noted: 2019-12-26

## 2021-06-20 PROBLEM — Z79.899 OTHER LONG TERM (CURRENT) DRUG THERAPY: Status: ACTIVE | Noted: 2019-10-08

## 2021-08-13 NOTE — PROGRESS NOTES
Patient back from [place]. Schedule annual mammogram on 05/29/2018 at 1:40 pm. Patient in agreement and vocalize understanding. I will send appointment reminder in the mail today.  
poor minus